# Patient Record
Sex: FEMALE | Race: WHITE | NOT HISPANIC OR LATINO | Employment: FULL TIME | URBAN - METROPOLITAN AREA
[De-identification: names, ages, dates, MRNs, and addresses within clinical notes are randomized per-mention and may not be internally consistent; named-entity substitution may affect disease eponyms.]

---

## 2019-01-14 ENCOUNTER — OFFICE VISIT (OUTPATIENT)
Dept: FAMILY MEDICINE CLINIC | Facility: CLINIC | Age: 59
End: 2019-01-14
Payer: COMMERCIAL

## 2019-01-14 VITALS
HEIGHT: 65 IN | BODY MASS INDEX: 17.86 KG/M2 | SYSTOLIC BLOOD PRESSURE: 104 MMHG | TEMPERATURE: 99.3 F | DIASTOLIC BLOOD PRESSURE: 78 MMHG | WEIGHT: 107.2 LBS

## 2019-01-14 DIAGNOSIS — Z12.11 SCREENING FOR COLON CANCER: ICD-10-CM

## 2019-01-14 DIAGNOSIS — Z23 ENCOUNTER FOR VACCINATION: ICD-10-CM

## 2019-01-14 DIAGNOSIS — R10.11 RIGHT UPPER QUADRANT ABDOMINAL PAIN: ICD-10-CM

## 2019-01-14 DIAGNOSIS — K59.01 SLOW TRANSIT CONSTIPATION: Primary | ICD-10-CM

## 2019-01-14 DIAGNOSIS — Z11.59 NEED FOR HEPATITIS C SCREENING TEST: ICD-10-CM

## 2019-01-14 DIAGNOSIS — Z12.4 SCREENING FOR CERVICAL CANCER: ICD-10-CM

## 2019-01-14 PROCEDURE — 99213 OFFICE O/P EST LOW 20 MIN: CPT | Performed by: FAMILY MEDICINE

## 2019-01-14 RX ORDER — OXYCODONE AND ACETAMINOPHEN 7.5; 325 MG/1; MG/1
TABLET ORAL EVERY 6 HOURS
COMMUNITY
End: 2019-11-05

## 2019-01-14 RX ORDER — PREGABALIN 75 MG/1
1 CAPSULE ORAL 2 TIMES DAILY
COMMUNITY
Start: 2013-02-20 | End: 2019-11-05

## 2019-01-14 RX ORDER — DULOXETIN HYDROCHLORIDE 30 MG/1
30 CAPSULE, DELAYED RELEASE ORAL DAILY
COMMUNITY
End: 2019-11-05

## 2019-01-14 RX ORDER — ALPRAZOLAM 0.5 MG/1
TABLET ORAL
COMMUNITY
End: 2019-11-05

## 2019-01-14 RX ORDER — OMEPRAZOLE 20 MG/1
1 CAPSULE, DELAYED RELEASE ORAL DAILY
COMMUNITY
Start: 2015-09-08 | End: 2019-11-05

## 2019-01-14 RX ORDER — ZOLPIDEM TARTRATE 10 MG/1
TABLET ORAL
COMMUNITY
End: 2019-11-05

## 2019-01-14 RX ORDER — LIDOCAINE 50 MG/G
OINTMENT TOPICAL AS NEEDED
COMMUNITY
Start: 2015-04-28 | End: 2019-11-05

## 2019-01-14 RX ORDER — CARISOPRODOL 350 MG/1
350 TABLET ORAL
COMMUNITY
Start: 2013-02-20 | End: 2019-11-05

## 2019-01-14 RX ORDER — LUBIPROSTONE 8 UG/1
CAPSULE, GELATIN COATED ORAL EVERY 12 HOURS
COMMUNITY
End: 2019-01-14

## 2019-01-14 RX ORDER — MELOXICAM 7.5 MG/1
7.5 TABLET ORAL DAILY
COMMUNITY
End: 2019-11-05

## 2019-01-14 RX ORDER — TRAMADOL HYDROCHLORIDE 50 MG/1
TABLET ORAL EVERY 6 HOURS
COMMUNITY
End: 2019-11-05

## 2019-01-14 NOTE — PROGRESS NOTES
Assessment/Plan:  Patient will try to minimize use of tramadol or Percocet  Patient go for ultrasound of the right upper quadrant to rule out gallbladder disease  Patient will see GI in follow-up  Would recommend stool softener routinely  Patient will stop laxatives  Patient use MiraLax daily  Diagnoses and all orders for this visit:    Right upper quadrant abdominal pain  -     US abdomen limited; Future  -     Ambulatory referral to Gastroenterology; Future    Screening for colon cancer  -     Ambulatory referral to Gastroenterology; Future    Need for hepatitis C screening test    Encounter for vaccination  -     Flu vaccine greater than or equal to 4yo preservative free IM  -     TDAP VACCINE GREATER THAN OR EQUAL TO 6YO IM    Screening for cervical cancer  -     Ambulatory referral to Gynecology; Future    Slow transit constipation  -     Ambulatory referral to Gastroenterology; Future    Other orders  -     Cancel: Occult Blood, Fecal Immunochemical; Future  -     Cancel: Hepatitis C antibody; Future  -     ALPRAZolam (XANAX) 0 5 mg tablet; Every 6 hours  -     Discontinue: lubiprostone (AMITIZA) 8 mcg capsule; Every 12 hours  -     DULoxetine (CYMBALTA) 30 mg delayed release capsule; Take 30 mg by mouth daily    -     lidocaine (XYLOCAINE) 5 % ointment; Apply topically  -     pregabalin (LYRICA) 75 mg capsule; Take 1 capsule by mouth 2 (two) times a day  -     meloxicam (MOBIC) 7 5 mg tablet; Take 7 5 mg by mouth Daily    -     oxyCODONE-acetaminophen (PERCOCET) 7 5-325 MG per tablet; every 6 (six) hours  -     Discontinue: piroxicam (FELDENE) 20 mg capsule; Take 1 capsule by mouth Daily  -     omeprazole (PRILOSEC) 20 mg delayed release capsule; Take 1 capsule by mouth daily  -     carisoprodol (SOMA) 350 mg tablet; Take 350 mg by mouth daily at bedtime    -     traMADol (ULTRAM) 50 mg tablet; every 6 (six) hours  -     zolpidem (AMBIEN) 10 mg tablet;  Take 1 tablet as needed by oral route in the evening for 30 days  Subjective:      Patient ID: Meghana Kimbrough is a 60 y o  female  Patient is here with constipation and some abdominal bloating and distention over the course of 2 months  No rectal bleeding noted  Patient also gets some discomfort in the left lower quadrant which she has presently  Patient also gets some pain after eating in the epigastric region  Patient has had some radiation to right upper chest wall and scapula  Patient has been eating slightly more meat than normal   Patient usually has vegetarian like diet  No fever or vomiting noted  Patient has had nausea  Patient has tried Amitiza as well as duplex for constipation  Patient has had 3 bouts of nausea  No substernal chest pain or shortness of breath  Patient has use Mag citrate  The patient has not had colonoscopy  Abdominal Pain   Associated symptoms include arthralgias and constipation  Pertinent negatives include no diarrhea, nausea or vomiting  The following portions of the patient's history were reviewed and updated as appropriate: allergies, current medications, past family history, past medical history, past social history, past surgical history and problem list     Review of Systems   Constitutional: Negative  HENT: Negative  Eyes: Negative  Respiratory: Negative  Cardiovascular: Negative  Gastrointestinal: Positive for abdominal distention, abdominal pain and constipation  Negative for blood in stool, diarrhea, nausea, rectal pain and vomiting  Endocrine: Negative  Genitourinary: Negative  Musculoskeletal: Positive for arthralgias, back pain and neck pain  Skin: Negative  Allergic/Immunologic: Negative  Neurological: Negative  Hematological: Negative  Psychiatric/Behavioral: Negative            Objective:      /78 (BP Location: Right arm, Patient Position: Sitting, Cuff Size: Adult)   Temp 99 3 °F (37 4 °C) (Tympanic)   Ht 5' 4 5" (1 638 m)   Wt 48 6 kg (107 lb 3 2 oz)   BMI 18 12 kg/m²          Physical Exam   Constitutional: She is oriented to person, place, and time  She appears well-developed and well-nourished  No distress  HENT:   Head: Normocephalic  Right Ear: External ear normal    Left Ear: External ear normal    Mouth/Throat: Oropharynx is clear and moist  No oropharyngeal exudate  Eyes: Pupils are equal, round, and reactive to light  EOM are normal  Right eye exhibits no discharge  Left eye exhibits no discharge  No scleral icterus  Neck: Normal range of motion  Neck supple  No thyromegaly present  Cardiovascular: Normal rate, regular rhythm, normal heart sounds and intact distal pulses  Exam reveals no gallop and no friction rub  No murmur heard  Pulmonary/Chest: Effort normal and breath sounds normal  No respiratory distress  She has no wheezes  She has no rales  She exhibits no tenderness  Abdominal: Soft  Bowel sounds are normal  She exhibits no distension  There is tenderness  There is no rebound and no guarding  Mild discomfort left lower quadrant   Musculoskeletal: Normal range of motion  She exhibits no edema or tenderness  Lymphadenopathy:     She has no cervical adenopathy  Neurological: She is oriented to person, place, and time  No cranial nerve deficit  She exhibits normal muscle tone  Coordination normal    Skin: Skin is warm and dry  No rash noted  She is not diaphoretic  No erythema  No pallor  Psychiatric: She has a normal mood and affect  Her behavior is normal  Judgment and thought content normal    Nursing note and vitals reviewed

## 2019-02-28 ENCOUNTER — OFFICE VISIT (OUTPATIENT)
Dept: FAMILY MEDICINE CLINIC | Facility: CLINIC | Age: 59
End: 2019-02-28
Payer: COMMERCIAL

## 2019-02-28 VITALS
BODY MASS INDEX: 18.23 KG/M2 | SYSTOLIC BLOOD PRESSURE: 128 MMHG | DIASTOLIC BLOOD PRESSURE: 76 MMHG | WEIGHT: 106.8 LBS | HEIGHT: 64 IN | TEMPERATURE: 99 F

## 2019-02-28 DIAGNOSIS — R41.3 MEMORY LOSS: ICD-10-CM

## 2019-02-28 DIAGNOSIS — H53.9 VISUAL DISTURBANCE: Primary | ICD-10-CM

## 2019-02-28 DIAGNOSIS — G44.219 EPISODIC TENSION-TYPE HEADACHE, NOT INTRACTABLE: ICD-10-CM

## 2019-02-28 DIAGNOSIS — R22.0 SCALP MASS: ICD-10-CM

## 2019-02-28 DIAGNOSIS — F41.9 ANXIETY: ICD-10-CM

## 2019-02-28 DIAGNOSIS — R42 DIZZINESS: ICD-10-CM

## 2019-02-28 PROBLEM — G44.209 TENSION TYPE HEADACHE: Status: ACTIVE | Noted: 2019-02-28

## 2019-02-28 PROCEDURE — 99214 OFFICE O/P EST MOD 30 MIN: CPT | Performed by: FAMILY MEDICINE

## 2019-02-28 PROCEDURE — 1036F TOBACCO NON-USER: CPT | Performed by: FAMILY MEDICINE

## 2019-02-28 PROCEDURE — 3008F BODY MASS INDEX DOCD: CPT | Performed by: FAMILY MEDICINE

## 2019-02-28 NOTE — PROGRESS NOTES
Assessment/Plan:  Patient go for MRI as well as laboratory studies  Guidance given overall  The patient will then have treatment dictated by the studies  Diagnoses and all orders for this visit:    Visual disturbance  -     CBC and differential; Future  -     Comprehensive metabolic panel; Future  -     Hemoglobin A1C; Future  -     Lipid panel; Future  -     TSH, 3rd generation with Free T4 reflex; Future  -     Vitamin B12; Future  -     Vitamin D 25 hydroxy; Future  -     Magnesium; Future  -     MRI brain w wo contrast; Future  -     Lyme Antibody Profile with reflex to WB; Future  -     UA w Reflex to Microscopic w Reflex to Culture -Lab Collect    Episodic tension-type headache, not intractable  -     CBC and differential; Future  -     Comprehensive metabolic panel; Future  -     Hemoglobin A1C; Future  -     Lipid panel; Future  -     TSH, 3rd generation with Free T4 reflex; Future  -     Vitamin B12; Future  -     Vitamin D 25 hydroxy; Future  -     Magnesium; Future  -     MRI brain w wo contrast; Future    Dizziness  -     CBC and differential; Future  -     Comprehensive metabolic panel; Future  -     Hemoglobin A1C; Future  -     Lipid panel; Future  -     TSH, 3rd generation with Free T4 reflex; Future  -     Vitamin B12; Future  -     Vitamin D 25 hydroxy; Future  -     Magnesium; Future  -     MRI brain w wo contrast; Future  -     Lyme Antibody Profile with reflex to WB; Future  -     UA w Reflex to Microscopic w Reflex to Culture -Lab Collect    Memory loss  -     CBC and differential; Future  -     Comprehensive metabolic panel; Future  -     Hemoglobin A1C; Future  -     Lipid panel; Future  -     TSH, 3rd generation with Free T4 reflex; Future  -     Vitamin B12; Future  -     Vitamin D 25 hydroxy; Future  -     Magnesium; Future  -     MRI brain w wo contrast; Future  -     Lyme Antibody Profile with reflex to WB;  Future  -     UA w Reflex to Microscopic w Reflex to Culture -Lab Collect    Anxiety  -     CBC and differential; Future  -     Comprehensive metabolic panel; Future  -     Hemoglobin A1C; Future  -     Lipid panel; Future  -     TSH, 3rd generation with Free T4 reflex; Future  -     Vitamin B12; Future  -     Vitamin D 25 hydroxy; Future  -     Magnesium; Future    Scalp mass  -     MRI brain w wo contrast; Future          Subjective:      Patient ID: Ana Rosa is a 60 y o  female  Patient is here with mass on top of her head for roughly 2 weeks  Patient notices a burning sensation over this region  Patient also with some tingling in her hands and upper lip  Patient also with some visual disturbance/blurred vision  Patient also with intermittent headaches in the parietal occipital region not relieved by Tylenol  Patient also with some dizziness and feeling off balance along with some memory related issues  Patient also with increased stress related to family  The following portions of the patient's history were reviewed and updated as appropriate: allergies, current medications, past family history, past medical history, past social history, past surgical history and problem list     Review of Systems   Constitutional: Negative  HENT: Negative  Eyes: Positive for visual disturbance  Respiratory: Negative  Cardiovascular: Negative  Gastrointestinal: Negative  Endocrine: Negative  Genitourinary: Negative  Musculoskeletal: Negative  Skin: Negative  The scalp mass   Allergic/Immunologic: Negative  Neurological: Positive for dizziness, numbness and headaches  Memory loss   Hematological: Negative  Psychiatric/Behavioral: Positive for behavioral problems           Objective:      /76 (BP Location: Right arm, Patient Position: Sitting, Cuff Size: Adult)   Temp 99 °F (37 2 °C) (Tympanic)   Ht 5' 4 25" (1 632 m)   Wt 48 4 kg (106 lb 12 8 oz)   BMI 18 19 kg/m²          Physical Exam   Constitutional: She is oriented to person, place, and time  She appears well-developed and well-nourished  No distress  HENT:   Head: Normocephalic  Right Ear: External ear normal    Left Ear: External ear normal    Mouth/Throat: Oropharynx is clear and moist  No oropharyngeal exudate  Eyes: Pupils are equal, round, and reactive to light  EOM are normal  Right eye exhibits no discharge  Left eye exhibits no discharge  No scleral icterus  Neck: Normal range of motion  Neck supple  No thyromegaly present  Cardiovascular: Normal rate, regular rhythm, normal heart sounds and intact distal pulses  Exam reveals no gallop and no friction rub  No murmur heard  Pulmonary/Chest: Effort normal and breath sounds normal  No respiratory distress  She has no wheezes  She has no rales  She exhibits no tenderness  Abdominal: Soft  Bowel sounds are normal  She exhibits no distension  There is no tenderness  There is no rebound and no guarding  Musculoskeletal: Normal range of motion  She exhibits no edema or tenderness  Lymphadenopathy:     She has no cervical adenopathy  Neurological: She is alert and oriented to person, place, and time  A sensory deficit is present  No cranial nerve deficit  She exhibits normal muscle tone  Coordination normal    Skin: Skin is warm and dry  No rash noted  She is not diaphoretic  No erythema  No pallor  The Pilar cyst/mass on scalp right of midline parietal region   Psychiatric: She has a normal mood and affect  Her behavior is normal  Judgment and thought content normal    Nursing note and vitals reviewed

## 2019-03-06 ENCOUNTER — TELEPHONE (OUTPATIENT)
Dept: FAMILY MEDICINE CLINIC | Facility: CLINIC | Age: 59
End: 2019-03-06

## 2019-03-06 LAB
25(OH)D3 SERPL-MCNC: 15 NG/ML (ref 30–100)
ALBUMIN SERPL-MCNC: 4.5 G/DL (ref 3.6–5.1)
ALBUMIN/GLOB SERPL: 1.5 (CALC) (ref 1–2.5)
ALP SERPL-CCNC: 90 U/L (ref 33–130)
ALT SERPL-CCNC: 36 U/L (ref 6–29)
APPEARANCE UR: CLEAR
AST SERPL-CCNC: 32 U/L (ref 10–35)
B BURGDOR AB SER IA-ACNC: <0.9 INDEX
BACTERIA UR CULT: NORMAL
BACTERIA UR QL AUTO: NORMAL /HPF
BASOPHILS # BLD AUTO: 24 CELLS/UL (ref 0–200)
BASOPHILS NFR BLD AUTO: 0.3 %
BILIRUB SERPL-MCNC: 0.6 MG/DL (ref 0.2–1.2)
BILIRUB UR QL STRIP: NEGATIVE
BUN SERPL-MCNC: 15 MG/DL (ref 7–25)
BUN/CREAT SERPL: ABNORMAL (CALC) (ref 6–22)
CALCIUM SERPL-MCNC: 9.5 MG/DL (ref 8.6–10.4)
CHLORIDE SERPL-SCNC: 103 MMOL/L (ref 98–110)
CHOLEST SERPL-MCNC: 196 MG/DL
CHOLEST/HDLC SERPL: 2.4 (CALC)
CO2 SERPL-SCNC: 30 MMOL/L (ref 20–32)
COLOR UR: YELLOW
CREAT SERPL-MCNC: 0.84 MG/DL (ref 0.5–1.05)
EOSINOPHIL # BLD AUTO: 87 CELLS/UL (ref 15–500)
EOSINOPHIL NFR BLD AUTO: 1.1 %
ERYTHROCYTE [DISTWIDTH] IN BLOOD BY AUTOMATED COUNT: 11.8 % (ref 11–15)
GLOBULIN SER CALC-MCNC: 3.1 G/DL (CALC) (ref 1.9–3.7)
GLUCOSE SERPL-MCNC: 91 MG/DL (ref 65–99)
GLUCOSE UR QL STRIP: NEGATIVE
HBA1C MFR BLD: 5.2 % OF TOTAL HGB
HCT VFR BLD AUTO: 44.1 % (ref 35–45)
HDLC SERPL-MCNC: 83 MG/DL
HGB BLD-MCNC: 15 G/DL (ref 11.7–15.5)
HGB UR QL STRIP: NEGATIVE
HYALINE CASTS #/AREA URNS LPF: NORMAL /LPF
KETONES UR QL STRIP: NEGATIVE
LDLC SERPL CALC-MCNC: 100 MG/DL (CALC)
LEUKOCYTE ESTERASE UR QL STRIP: NEGATIVE
LYMPHOCYTES # BLD AUTO: 2252 CELLS/UL (ref 850–3900)
LYMPHOCYTES NFR BLD AUTO: 28.5 %
MAGNESIUM SERPL-MCNC: 2.1 MG/DL (ref 1.5–2.5)
MCH RBC QN AUTO: 32.7 PG (ref 27–33)
MCHC RBC AUTO-ENTMCNC: 34 G/DL (ref 32–36)
MCV RBC AUTO: 96.1 FL (ref 80–100)
MONOCYTES # BLD AUTO: 545 CELLS/UL (ref 200–950)
MONOCYTES NFR BLD AUTO: 6.9 %
NEUTROPHILS # BLD AUTO: 4993 CELLS/UL (ref 1500–7800)
NEUTROPHILS NFR BLD AUTO: 63.2 %
NITRITE UR QL STRIP: NEGATIVE
NONHDLC SERPL-MCNC: 113 MG/DL (CALC)
PH UR STRIP: NORMAL [PH] (ref 5–8)
PLATELET # BLD AUTO: 273 THOUSAND/UL (ref 140–400)
PMV BLD REES-ECKER: 11.7 FL (ref 7.5–12.5)
POTASSIUM SERPL-SCNC: 4.1 MMOL/L (ref 3.5–5.3)
PROT SERPL-MCNC: 7.6 G/DL (ref 6.1–8.1)
PROT UR QL STRIP: NEGATIVE
RBC # BLD AUTO: 4.59 MILLION/UL (ref 3.8–5.1)
RBC #/AREA URNS HPF: NORMAL /HPF
SL AMB EGFR AFRICAN AMERICAN: 88 ML/MIN/1.73M2
SL AMB EGFR NON AFRICAN AMERICAN: 76 ML/MIN/1.73M2
SODIUM SERPL-SCNC: 140 MMOL/L (ref 135–146)
SP GR UR STRIP: 1.02 (ref 1–1.03)
SQUAMOUS #/AREA URNS HPF: NORMAL /HPF
TRIGL SERPL-MCNC: 52 MG/DL
TSH SERPL-ACNC: 1.39 MIU/L (ref 0.4–4.5)
WBC # BLD AUTO: 7.9 THOUSAND/UL (ref 3.8–10.8)
WBC #/AREA URNS HPF: NORMAL /HPF

## 2019-03-06 NOTE — TELEPHONE ENCOUNTER
Dr Gayla Brown order vit D3  [address]315 unit  for 13 weeks, ordered today then stated to patient to re-check with in six months

## 2019-03-14 ENCOUNTER — HOSPITAL ENCOUNTER (OUTPATIENT)
Dept: MRI IMAGING | Facility: HOSPITAL | Age: 59
Discharge: HOME | End: 2019-03-14
Payer: COMMERCIAL

## 2019-03-14 DIAGNOSIS — H53.9 VISUAL DISTURBANCE: ICD-10-CM

## 2019-03-14 DIAGNOSIS — R42 DIZZINESS: ICD-10-CM

## 2019-03-14 DIAGNOSIS — R22.0 SCALP MASS: ICD-10-CM

## 2019-03-14 DIAGNOSIS — R41.3 MEMORY LOSS: ICD-10-CM

## 2019-03-14 DIAGNOSIS — G44.219 EPISODIC TENSION-TYPE HEADACHE, NOT INTRACTABLE: ICD-10-CM

## 2019-03-14 PROCEDURE — 70553 MRI BRAIN STEM W/O & W/DYE: CPT

## 2019-03-14 PROCEDURE — A9585 GADOBUTROL INJECTION: HCPCS | Performed by: FAMILY MEDICINE

## 2019-03-14 RX ADMIN — GADOBUTROL 5 ML: 604.72 INJECTION INTRAVENOUS at 14:32

## 2019-03-28 DIAGNOSIS — D33.3 SCHWANNOMA OF CRANIAL NERVE (HCC): Primary | ICD-10-CM

## 2019-05-15 ENCOUNTER — TELEPHONE (OUTPATIENT)
Dept: FAMILY MEDICINE CLINIC | Facility: CLINIC | Age: 59
End: 2019-05-15

## 2019-05-16 ENCOUNTER — OFFICE VISIT (OUTPATIENT)
Dept: FAMILY MEDICINE CLINIC | Facility: CLINIC | Age: 59
End: 2019-05-16
Payer: COMMERCIAL

## 2019-05-16 VITALS
HEIGHT: 64 IN | DIASTOLIC BLOOD PRESSURE: 72 MMHG | BODY MASS INDEX: 18.3 KG/M2 | WEIGHT: 107.2 LBS | SYSTOLIC BLOOD PRESSURE: 100 MMHG

## 2019-05-16 DIAGNOSIS — R22.0 SCALP MASS: ICD-10-CM

## 2019-05-16 DIAGNOSIS — Z11.59 NEED FOR HEPATITIS C SCREENING TEST: Primary | ICD-10-CM

## 2019-05-16 DIAGNOSIS — D33.3 ACOUSTIC NEUROMA (HCC): ICD-10-CM

## 2019-05-16 PROCEDURE — 1036F TOBACCO NON-USER: CPT | Performed by: FAMILY MEDICINE

## 2019-05-16 PROCEDURE — 3008F BODY MASS INDEX DOCD: CPT | Performed by: FAMILY MEDICINE

## 2019-05-16 PROCEDURE — 99213 OFFICE O/P EST LOW 20 MIN: CPT | Performed by: FAMILY MEDICINE

## 2019-10-16 ENCOUNTER — OFFICE VISIT (OUTPATIENT)
Dept: FAMILY MEDICINE CLINIC | Facility: CLINIC | Age: 59
End: 2019-10-16
Payer: COMMERCIAL

## 2019-10-16 VITALS
WEIGHT: 106 LBS | DIASTOLIC BLOOD PRESSURE: 62 MMHG | SYSTOLIC BLOOD PRESSURE: 110 MMHG | HEART RATE: 86 BPM | BODY MASS INDEX: 18.1 KG/M2 | HEIGHT: 64 IN

## 2019-10-16 DIAGNOSIS — D33.3 ACOUSTIC NEUROMA (HCC): ICD-10-CM

## 2019-10-16 DIAGNOSIS — K59.01 SLOW TRANSIT CONSTIPATION: ICD-10-CM

## 2019-10-16 DIAGNOSIS — D49.9 TUMOR: Primary | ICD-10-CM

## 2019-10-16 DIAGNOSIS — Z01.818 PREOP EXAMINATION: Primary | ICD-10-CM

## 2019-10-16 DIAGNOSIS — M19.079 ARTHRITIS OF BIG TOE: ICD-10-CM

## 2019-10-16 PROCEDURE — 99214 OFFICE O/P EST MOD 30 MIN: CPT | Performed by: FAMILY MEDICINE

## 2019-10-16 NOTE — PROGRESS NOTES
Assessment/Plan:  Patient will stop meloxicam 1 week prior to surgery  Guidance given overall  Patient at low risk for cardiopulmonary event  Okay to proceed with surgery if CBC is normal   Labs pending  Diagnoses and all orders for this visit:    Preop examination    Arthritis of big toe    Acoustic neuroma (HCC)    Slow transit constipation            Subjective:        Patient ID: Jose Hernández is a 60 y o  female  Patient is here for preop clearance requested by Dr Emir Escalera for right 1st toe cheilectomy to be done on November 12th  Patient without chest pain or shortness of breath or headache or blurred vision or problems urinating or defecating  No dizziness or passing out or palpitations  No fevers or chills or cough or cold symptoms  Patient does have pain in her right 1st toe with decreased range of motion  No bleeding issues such as rectal bleeding urinary bleeding or nosebleeds  Patient does not have problems with anesthesia in the past   Patient did have laboratory studies done yesterday  Results are pending        The following portions of the patient's history were reviewed and updated as appropriate: allergies, current medications, past family history, past medical history, past social history, past surgical history and problem list       Review of Systems   Constitutional: Negative  HENT: Negative  Eyes: Negative  Respiratory: Negative  Cardiovascular: Negative  Gastrointestinal: Negative  Endocrine: Negative  Genitourinary: Negative  Musculoskeletal: Positive for arthralgias and gait problem  Skin: Negative  Allergic/Immunologic: Negative  Hematological: Negative  Psychiatric/Behavioral: Negative  Objective:      /62 (BP Location: Right arm)   Pulse 86   Ht 5' 4 25" (1 632 m)   Wt 48 1 kg (106 lb)   BMI 18 05 kg/m²          Physical Exam   Constitutional: She is oriented to person, place, and time   She appears well-developed and well-nourished  No distress  HENT:   Head: Normocephalic  Right Ear: External ear normal    Left Ear: External ear normal    Mouth/Throat: Oropharynx is clear and moist  No oropharyngeal exudate  Eyes: Pupils are equal, round, and reactive to light  EOM are normal  Right eye exhibits no discharge  Left eye exhibits no discharge  No scleral icterus  Neck: Normal range of motion  Neck supple  No thyromegaly present  Cardiovascular: Normal rate, regular rhythm, normal heart sounds and intact distal pulses  Exam reveals no gallop and no friction rub  No murmur heard  Pulmonary/Chest: Effort normal and breath sounds normal  No respiratory distress  She has no wheezes  She has no rales  She exhibits no tenderness  Abdominal: Soft  Bowel sounds are normal  She exhibits no distension  There is no tenderness  There is no rebound and no guarding  Musculoskeletal: She exhibits tenderness and deformity  She exhibits no edema  Swelling of 1st MTP joint on the right  No significant pain but decreased range of motion  Lymphadenopathy:     She has no cervical adenopathy  Neurological: She is oriented to person, place, and time  No cranial nerve deficit  She exhibits normal muscle tone  Coordination normal    Skin: Skin is warm and dry  No rash noted  She is not diaphoretic  No erythema  No pallor  Psychiatric: She has a normal mood and affect  Her behavior is normal  Judgment and thought content normal    Nursing note and vitals reviewed

## 2019-10-18 DIAGNOSIS — R22.0 SCALP MASS: Primary | ICD-10-CM

## 2019-10-18 NOTE — PROGRESS NOTES
Patient called in today for lab testing prior to her seeing [facility]  LABS:    FSH, LH, Growth Hormone, TSH-T4 Free, T3, ACTH, Cortisol, Prolacin, IGF-1    All were approved by patients' insurance

## 2019-10-22 LAB
CORTIS AM PEAK SERPL-MCNC: 16.5 MCG/DL
FSH SERPL-ACNC: 99 MIU/ML
GH SERPL-MCNC: 7.3 NG/ML
IGF-I SERPL-MCNC: 109 NG/ML (ref 41–279)
IGF-I Z-SCORE SERPL: -0.4 SD
LH SERPL-ACNC: 23.9 MIU/ML
PROLACTIN SERPL-MCNC: 5.7 NG/ML
T3 SERPL-MCNC: 112 NG/DL (ref 76–181)
T4 FREE SERPL-MCNC: 1.2 NG/DL (ref 0.8–1.8)
TSH SERPL-ACNC: 0.87 MIU/L (ref 0.4–4.5)

## 2019-11-04 ENCOUNTER — OFFICE VISIT (OUTPATIENT)
Dept: NEUROSURGERY | Facility: CLINIC | Age: 59
End: 2019-11-04
Payer: COMMERCIAL

## 2019-11-04 VITALS
RESPIRATION RATE: 16 BRPM | HEART RATE: 91 BPM | WEIGHT: 105 LBS | BODY MASS INDEX: 17.49 KG/M2 | SYSTOLIC BLOOD PRESSURE: 119 MMHG | DIASTOLIC BLOOD PRESSURE: 81 MMHG | TEMPERATURE: 98.5 F | HEIGHT: 65 IN

## 2019-11-04 DIAGNOSIS — D33.3 ACOUSTIC NEUROMA (HCC): Primary | ICD-10-CM

## 2019-11-04 DIAGNOSIS — R90.89 ABNORMAL BRAIN MRI: ICD-10-CM

## 2019-11-04 PROCEDURE — 99204 OFFICE O/P NEW MOD 45 MIN: CPT | Performed by: NEUROLOGICAL SURGERY

## 2019-11-04 RX ORDER — ACETAMINOPHEN 325 MG/1
650 TABLET ORAL 2 TIMES DAILY
COMMUNITY
End: 2021-10-26

## 2019-11-04 RX ORDER — MULTIVITAMIN
1 TABLET ORAL DAILY
COMMUNITY

## 2019-11-04 NOTE — PROGRESS NOTES
Neurosurgery Office Note  Eliseo Kowalski 60 y o  female MRN: [id number]      Assessment/Plan      Diagnoses and all orders for this visit:    Acoustic neuroma [facility])  -     MRI brain IAC wo and w contrast; Future  -     Audiometry with tympanometry; Future    Abnormal brain MRI    Other orders  -     acetaminophen (TYLENOL) 325 mg tablet; Take 650 mg by mouth as needed for mild pain  -     Multiple Vitamin (MULTIVITAMIN) tablet; Take 1 tablet by mouth daily  -     VITAMIN D PO; Take by mouth daily        Discussion:     60year old woman referred by Dr Jennifer Crain to address pituitary finding on MRI scan, as well as presumed right acoustic neuroma  Patient feels she has had worsening headaches, that she feels are most consistent with migraines  She is an AP  In psychology/psychiatry  She denies hearing loss, vertigo, tinnitus  She did mention her father had an acoustic neuroma  She has other more endocrinologic complaints such as hair thinning/loss, brittle mass, hot cold sensitivity, etc   She was seen at [facility] and referred for an MRI scan of the brain  This study made note of findings in the pituitary and right IAC  She was not satisfied with her follow-up at  [place] [address], and has been referred by her PCP for management  MRI scan of brain from 2019 personally reviewed  There is mention of hypointensity in the posterior pituitary  On the previous study, but post Radiology and I agree on the present study is occiput quite normal   She does have a 2 mm enhancing area in the right IAC, which was also visible on the MRI scan with contrast of the brain done 10/2008  It appears the same size  I reviewed the patient natural history of acoustic neuromas  I discussed options for treatment, but certainly in her case recommend observation  As she has a scan done some 11 years ago, I feel a follow-up study in 1-2 years would be reasonable    The patient also 1 on the on a tree at [facility] cristina, was told it was normal   We will attempt to obtain these records  I think follow-up audiometry at the same time as her upcoming MRI scan would also be reasonable  Regarding a pituitary finding, her pituitary panel labs were also reviewed personally  While her growth hormone is just at the upper edge of normal, or IGF 1 and or other labs are within normal limits  As such I do not feel she has clearly a pituitary finding  This we can follow with surveillance imaging of her IAC  Patient had issues also with intermittent shortness of breath, chest pain, finger tingling, etc   She is under considerable stress lately with, her mother is now living with her etc   She remarked she will discuss having a cardiac evaluation with Dr Ron Arias  Furthermore, should this not be successful managing her headaches, referral to  Headache Neurology may be reasonable  Otherwise all plans patient back in a year or 2 with follow-up audiometry an MRI scan, which we have ordered  metrics:  [id number], VA S 70, KPS 90, ECOG 0     CHIEF COMPLAINT    Chief Complaint   Patient presents with    Consult     NP Consult for 2nd Opinion; referred by Dr Kesha Harley    History of Present Illness     60y o  year old female     HPI    See Discussion    REVIEW OF SYSTEMS    Review of Systems   Constitutional: Positive for fatigue (severe fatigue, new) and fever (1 episode in October with dizziness and nausea)  HENT: Negative  C/o hair loss   Eyes: Positive for photophobia, pain (pain behind the eyes with HA) and visual disturbance (blurred vision and floaters, wiggling)  1 episode of visual disturbance where vision crossed and then came back in September   Respiratory: Positive for chest tightness and shortness of breath  Cardiovascular: Positive for chest pain and palpitations  Gastrointestinal: Positive for nausea  Endocrine: Positive for cold intolerance  Genitourinary: Negative  Musculoskeletal: Positive for arthralgias (sometimes every joint in her body hurts), back pain, joint swelling, neck pain and neck stiffness  Skin: Negative  Allergic/Immunologic: Positive for environmental allergies and food allergies  Neurological: Positive for dizziness, light-headedness, numbness (fingertips) and headaches  Hematological: Negative  Psychiatric/Behavioral: Positive for agitation, confusion, decreased concentration and sleep disturbance  Negative for dysphoric mood  The patient is nervous/anxious  Meds/Allergies     Current Outpatient Medications   Medication Sig Dispense Refill    acetaminophen (TYLENOL) 325 mg tablet Take 650 mg by mouth as needed for mild pain      Multiple Vitamin (MULTIVITAMIN) tablet Take 1 tablet by mouth daily      VITAMIN D PO Take by mouth daily      ALPRAZolam (XANAX) 0 5 mg tablet Every 6 hours      carisoprodol (SOMA) 350 mg tablet Take 350 mg by mouth daily at bedtime        DULoxetine (CYMBALTA) 30 mg delayed release capsule Take 30 mg by mouth daily        lidocaine (XYLOCAINE) 5 % ointment Apply topically as needed       meloxicam (MOBIC) 7 5 mg tablet Take 7 5 mg by mouth Daily        omeprazole (PRILOSEC) 20 mg delayed release capsule Take 1 capsule by mouth daily      oxyCODONE-acetaminophen (PERCOCET) 7 5-325 MG per tablet every 6 (six) hours      pregabalin (LYRICA) 75 mg capsule Take 1 capsule by mouth 2 (two) times a day      traMADol (ULTRAM) 50 mg tablet every 6 (six) hours      zolpidem (AMBIEN) 10 mg tablet Take 1 tablet as needed by oral route in the evening for 30 days  No current facility-administered medications for this visit  Allergies   Allergen Reactions    Other Anaphylaxis     Hazelnuts    Psyllium Itching    Sulfa Antibiotics        PAST HISTORY    History reviewed  No pertinent past medical history  History reviewed  No pertinent surgical history      Social History     Tobacco Use    Smoking status: Former Smoker     Last attempt to quit: 2014     Years since quittin 8    Smokeless tobacco: Never Used    Tobacco comment: on and off over 40 years , less than a pck per day   Substance Use Topics    Alcohol use: No    Drug use: No       Family History   Problem Relation Age of Onset    Diabetes Family     Leukemia Family          Above history personally reviewed  EXAM    Vitals:Blood pressure 119/81, pulse 91, temperature 98 5 °F (36 9 °C), temperature source Tympanic, resp  rate 16, height 5' 5" (1 651 m), weight 47 6 kg (105 lb)  ,Body mass index is 17 47 kg/m²  Physical Exam   Constitutional: She is oriented to person, place, and time  She appears well-developed and well-nourished  HENT:   Head: Normocephalic  Eyes: No scleral icterus  Neck: Neck supple  Cardiovascular: Normal rate  Pulmonary/Chest: Effort normal    Abdominal: Soft  Neurological: She is alert and oriented to person, place, and time  GCS eye subscore is 4  GCS verbal subscore is 5  GCS motor subscore is 6  Skin: Skin is warm and dry  Psychiatric: She has a normal mood and affect  Her speech is normal and behavior is normal    Vitals reviewed  Neurologic Exam     Mental Status   Oriented to person, place, and time  Attention: normal    Speech: speech is normal   Level of consciousness: alert    Cranial Nerves     CN VII   Facial expression full, symmetric  Motor Exam   Muscle bulk: normal  Overall muscle tone: normal  Moves all extremities, grossly normal     Gait, Coordination, and Reflexes     Tremor   Resting tremor: absent  Intention tremor: absent  Action tremor: absent  No aids  MEDICAL DECISION MAKING    Imaging Studies:     MRI brain  [facility] 2019: I have personally reviewed pertinent reports     and I have personally reviewed pertinent films in PACS     Lab Results   Component Value Date    PROLACTIN 5 7 10/19/2019    CALCITONIN 109 10/19/2019    FREET4 1 2 10/19/2019    FSH 99 0 10/19/2019    LH 23 9 10/19/2019   GH 7 3 (H)  TSH 0 87

## 2019-11-04 NOTE — H&P (VIEW-ONLY)
Neurosurgery Office Note  Annabel Hill 60 y o  female MRN: [id number]      Assessment/Plan      Diagnoses and all orders for this visit:    Acoustic neuroma [facility])  -     MRI brain IAC wo and w contrast; Future  -     Audiometry with tympanometry; Future    Abnormal brain MRI    Other orders  -     acetaminophen (TYLENOL) 325 mg tablet; Take 650 mg by mouth as needed for mild pain  -     Multiple Vitamin (MULTIVITAMIN) tablet; Take 1 tablet by mouth daily  -     VITAMIN D PO; Take by mouth daily        Discussion:     60year old woman referred by Dr Meghan Bowser to address pituitary finding on MRI scan, as well as presumed right acoustic neuroma  Patient feels she has had worsening headaches, that she feels are most consistent with migraines  She is an AP  In psychology/psychiatry  She denies hearing loss, vertigo, tinnitus  She did mention her father had an acoustic neuroma  She has other more endocrinologic complaints such as hair thinning/loss, brittle mass, hot cold sensitivity, etc   She was seen at [facility] and referred for an MRI scan of the brain  This study made note of findings in the pituitary and right IAC  She was not satisfied with her follow-up at [facility], and has been referred by her PCP for management  MRI scan of brain from 2019 personally reviewed  There is mention of hypointensity in the posterior pituitary  On the previous study, but post Radiology and I agree on the present study is occiput quite normal   She does have a 2 mm enhancing area in the right IAC, which was also visible on the MRI scan with contrast of the brain done 10/2008  It appears the same size  I reviewed the patient natural history of acoustic neuromas  I discussed options for treatment, but certainly in her case recommend observation  As she has a scan done some 11 years ago, I feel a follow-up study in 1-2 years would be reasonable    The patient also 1 on the on a tree at [facility], was told it was normal   We will attempt to obtain these records  I think follow-up audiometry at the same time as her upcoming MRI scan would also be reasonable  Regarding a pituitary finding, her pituitary panel labs were also reviewed personally  While her growth hormone is just at the upper edge of normal, or IGF 1 and or other labs are within normal limits  As such I do not feel she has clearly a pituitary finding  This we can follow with surveillance imaging of her IAC  Patient had issues also with intermittent shortness of breath, chest pain, finger tingling, etc   She is under considerable stress lately with, her mother is now living with her etc   She remarked she will discuss having a cardiac evaluation with Dr Adelina Barragan  Furthermore, should this not be successful managing her headaches, referral to  Headache Neurology may be reasonable  Otherwise all plans patient back in a year or 2 with follow-up audiometry an MRI scan, which we have ordered  metrics:  [id number], VA S 70, KPS 90, ECOG 0     CHIEF COMPLAINT    Chief Complaint   Patient presents with    Consult     NP Consult for 2nd Opinion; referred by Dr Cruz Lantigua    History of Present Illness     60y o  year old female     HPI    See Discussion    REVIEW OF SYSTEMS    Review of Systems   Constitutional: Positive for fatigue (severe fatigue, new) and fever (1 episode in October with dizziness and nausea)  HENT: Negative  C/o hair loss   Eyes: Positive for photophobia, pain (pain behind the eyes with HA) and visual disturbance (blurred vision and floaters, wiggling)  1 episode of visual disturbance where vision crossed and then came back in September   Respiratory: Positive for chest tightness and shortness of breath  Cardiovascular: Positive for chest pain and palpitations  Gastrointestinal: Positive for nausea  Endocrine: Positive for cold intolerance  Genitourinary: Negative  Musculoskeletal: Positive for arthralgias (sometimes every joint in her body hurts), back pain, joint swelling, neck pain and neck stiffness  Skin: Negative  Allergic/Immunologic: Positive for environmental allergies and food allergies  Neurological: Positive for dizziness, light-headedness, numbness (fingertips) and headaches  Hematological: Negative  Psychiatric/Behavioral: Positive for agitation, confusion, decreased concentration and sleep disturbance  Negative for dysphoric mood  The patient is nervous/anxious  Meds/Allergies     Current Outpatient Medications   Medication Sig Dispense Refill    acetaminophen (TYLENOL) 325 mg tablet Take 650 mg by mouth as needed for mild pain      Multiple Vitamin (MULTIVITAMIN) tablet Take 1 tablet by mouth daily      VITAMIN D PO Take by mouth daily      ALPRAZolam (XANAX) 0 5 mg tablet Every 6 hours      carisoprodol (SOMA) 350 mg tablet Take 350 mg by mouth daily at bedtime        DULoxetine (CYMBALTA) 30 mg delayed release capsule Take 30 mg by mouth daily        lidocaine (XYLOCAINE) 5 % ointment Apply topically as needed       meloxicam (MOBIC) 7 5 mg tablet Take 7 5 mg by mouth Daily        omeprazole (PRILOSEC) 20 mg delayed release capsule Take 1 capsule by mouth daily      oxyCODONE-acetaminophen (PERCOCET) 7 5-325 MG per tablet every 6 (six) hours      pregabalin (LYRICA) 75 mg capsule Take 1 capsule by mouth 2 (two) times a day      traMADol (ULTRAM) 50 mg tablet every 6 (six) hours      zolpidem (AMBIEN) 10 mg tablet Take 1 tablet as needed by oral route in the evening for 30 days  No current facility-administered medications for this visit  Allergies   Allergen Reactions    Other Anaphylaxis     Hazelnuts    Psyllium Itching    Sulfa Antibiotics        PAST HISTORY    History reviewed  No pertinent past medical history  History reviewed  No pertinent surgical history      Social History     Tobacco Use    Smoking status: Former Smoker     Last attempt to quit: 2014     Years since quittin 8    Smokeless tobacco: Never Used    Tobacco comment: on and off over 40 years , less than a pck per day   Substance Use Topics    Alcohol use: No    Drug use: No       Family History   Problem Relation Age of Onset    Diabetes Family     Leukemia Family          Above history personally reviewed  EXAM    Vitals:Blood pressure 119/81, pulse 91, temperature 98 5 °F (36 9 °C), temperature source Tympanic, resp  rate 16, height 5' 5" (1 651 m), weight 47 6 kg (105 lb)  ,Body mass index is 17 47 kg/m²  Physical Exam   Constitutional: She is oriented to person, place, and time  She appears well-developed and well-nourished  HENT:   Head: Normocephalic  Eyes: No scleral icterus  Neck: Neck supple  Cardiovascular: Normal rate  Pulmonary/Chest: Effort normal    Abdominal: Soft  Neurological: She is alert and oriented to person, place, and time  GCS eye subscore is 4  GCS verbal subscore is 5  GCS motor subscore is 6  Skin: Skin is warm and dry  Psychiatric: She has a normal mood and affect  Her speech is normal and behavior is normal    Vitals reviewed  Neurologic Exam     Mental Status   Oriented to person, place, and time  Attention: normal    Speech: speech is normal   Level of consciousness: alert    Cranial Nerves     CN VII   Facial expression full, symmetric  Motor Exam   Muscle bulk: normal  Overall muscle tone: normal  Moves all extremities, grossly normal     Gait, Coordination, and Reflexes     Tremor   Resting tremor: absent  Intention tremor: absent  Action tremor: absent  No aids  MEDICAL DECISION MAKING    Imaging Studies:     MRI brain  [facility] 2019: I have personally reviewed pertinent reports     and I have personally reviewed pertinent films in PACS     Lab Results   Component Value Date    PROLACTIN 5 7 10/19/2019    CALCITONIN 109 10/19/2019    FREET4 1 2 10/19/2019    FSH 99 0 10/19/2019    LH 23 9 10/19/2019   GH 7 3 (H)  TSH 0 87

## 2019-11-05 NOTE — PERIOPERATIVE NURSING NOTE
Patient reports has recently had some new shortness of breath with exertion  Has reported it to her PCP at her visit for H&P    Reports testing so far has been normal

## 2019-11-05 NOTE — PRE-PROCEDURE INSTRUCTIONS
Pre-Surgery Instructions:   Medication Instructions    acetaminophen (TYLENOL) 325 mg tablet Instructed patient per Anesthesia Guidelines   Multiple Vitamin (MULTIVITAMIN) tablet Instructed patient per Anesthesia Guidelines   VITAMIN D PO Instructed patient per Anesthesia Guidelines  Instructed has no medications to be taken the morning of surgery  No aspirin, NSAIDs, vitamins, or supplements 1 week before surgery

## 2019-11-08 ENCOUNTER — DOCUMENTATION (OUTPATIENT)
Dept: NEUROSURGERY | Facility: CLINIC | Age: 59
End: 2019-11-08

## 2019-11-11 ENCOUNTER — ANESTHESIA EVENT (OUTPATIENT)
Dept: PERIOP | Facility: HOSPITAL | Age: 59
End: 2019-11-11
Payer: COMMERCIAL

## 2019-11-12 ENCOUNTER — ANESTHESIA (OUTPATIENT)
Dept: PERIOP | Facility: HOSPITAL | Age: 59
End: 2019-11-12
Payer: COMMERCIAL

## 2019-11-12 ENCOUNTER — APPOINTMENT (OUTPATIENT)
Dept: RADIOLOGY | Facility: HOSPITAL | Age: 59
End: 2019-11-12
Payer: COMMERCIAL

## 2019-11-12 ENCOUNTER — HOSPITAL ENCOUNTER (OUTPATIENT)
Facility: HOSPITAL | Age: 59
Setting detail: OUTPATIENT SURGERY
Discharge: HOME | End: 2019-11-12
Attending: PODIATRIST | Admitting: PODIATRIST
Payer: COMMERCIAL

## 2019-11-12 VITALS
HEART RATE: 78 BPM | OXYGEN SATURATION: 98 % | RESPIRATION RATE: 16 BRPM | DIASTOLIC BLOOD PRESSURE: 57 MMHG | SYSTOLIC BLOOD PRESSURE: 101 MMHG | HEIGHT: 65 IN | BODY MASS INDEX: 17.49 KG/M2 | TEMPERATURE: 97.5 F | WEIGHT: 105 LBS

## 2019-11-12 PROCEDURE — 73630 X-RAY EXAM OF FOOT: CPT

## 2019-11-12 RX ORDER — MIDAZOLAM HYDROCHLORIDE 2 MG/2ML
INJECTION, SOLUTION INTRAMUSCULAR; INTRAVENOUS AS NEEDED
Status: DISCONTINUED | OUTPATIENT
Start: 2019-11-12 | End: 2019-11-12 | Stop reason: SURG

## 2019-11-12 RX ORDER — DEXAMETHASONE SODIUM PHOSPHATE 4 MG/ML
INJECTION, SOLUTION INTRA-ARTICULAR; INTRALESIONAL; INTRAMUSCULAR; INTRAVENOUS; SOFT TISSUE AS NEEDED
Status: DISCONTINUED | OUTPATIENT
Start: 2019-11-12 | End: 2019-11-12 | Stop reason: HOSPADM

## 2019-11-12 RX ORDER — LIDOCAINE HYDROCHLORIDE 10 MG/ML
INJECTION, SOLUTION EPIDURAL; INFILTRATION; INTRACAUDAL; PERINEURAL AS NEEDED
Status: DISCONTINUED | OUTPATIENT
Start: 2019-11-12 | End: 2019-11-12 | Stop reason: HOSPADM

## 2019-11-12 RX ORDER — FENTANYL CITRATE/PF 50 MCG/ML
25 SYRINGE (ML) INJECTION
Status: DISCONTINUED | OUTPATIENT
Start: 2019-11-12 | End: 2019-11-12 | Stop reason: HOSPADM

## 2019-11-12 RX ORDER — SODIUM CHLORIDE 9 MG/ML
125 INJECTION, SOLUTION INTRAVENOUS CONTINUOUS
Status: DISCONTINUED | OUTPATIENT
Start: 2019-11-12 | End: 2019-11-12 | Stop reason: HOSPADM

## 2019-11-12 RX ORDER — FENTANYL CITRATE 50 UG/ML
INJECTION, SOLUTION INTRAMUSCULAR; INTRAVENOUS AS NEEDED
Status: DISCONTINUED | OUTPATIENT
Start: 2019-11-12 | End: 2019-11-12 | Stop reason: SURG

## 2019-11-12 RX ORDER — PROPOFOL 10 MG/ML
INJECTION, EMULSION INTRAVENOUS CONTINUOUS PRN
Status: DISCONTINUED | OUTPATIENT
Start: 2019-11-12 | End: 2019-11-12 | Stop reason: SURG

## 2019-11-12 RX ORDER — ONDANSETRON 2 MG/ML
INJECTION INTRAMUSCULAR; INTRAVENOUS AS NEEDED
Status: DISCONTINUED | OUTPATIENT
Start: 2019-11-12 | End: 2019-11-12 | Stop reason: SURG

## 2019-11-12 RX ORDER — ONDANSETRON 2 MG/ML
4 INJECTION INTRAMUSCULAR; INTRAVENOUS ONCE AS NEEDED
Status: DISCONTINUED | OUTPATIENT
Start: 2019-11-12 | End: 2019-11-12 | Stop reason: HOSPADM

## 2019-11-12 RX ORDER — MAGNESIUM HYDROXIDE 1200 MG/15ML
LIQUID ORAL AS NEEDED
Status: DISCONTINUED | OUTPATIENT
Start: 2019-11-12 | End: 2019-11-12 | Stop reason: HOSPADM

## 2019-11-12 RX ORDER — OXYCODONE HYDROCHLORIDE AND ACETAMINOPHEN 5; 325 MG/1; MG/1
1 TABLET ORAL EVERY 4 HOURS PRN
Status: DISCONTINUED | OUTPATIENT
Start: 2019-11-12 | End: 2019-11-12 | Stop reason: HOSPADM

## 2019-11-12 RX ADMIN — PROPOFOL 150 MCG/KG/MIN: 10 INJECTION, EMULSION INTRAVENOUS at 13:20

## 2019-11-12 RX ADMIN — SODIUM CHLORIDE 125 ML/HR: 0.9 INJECTION, SOLUTION INTRAVENOUS at 11:59

## 2019-11-12 RX ADMIN — FENTANYL CITRATE 100 MCG: 50 INJECTION, SOLUTION INTRAMUSCULAR; INTRAVENOUS at 13:23

## 2019-11-12 RX ADMIN — MIDAZOLAM 2 MG: 1 INJECTION INTRAMUSCULAR; INTRAVENOUS at 13:16

## 2019-11-12 RX ADMIN — ONDANSETRON 4 MG: 2 INJECTION INTRAMUSCULAR; INTRAVENOUS at 14:06

## 2019-11-12 RX ADMIN — FENTANYL CITRATE 100 MCG: 50 INJECTION, SOLUTION INTRAMUSCULAR; INTRAVENOUS at 13:34

## 2019-11-12 RX ADMIN — FENTANYL CITRATE 100 MCG: 50 INJECTION, SOLUTION INTRAMUSCULAR; INTRAVENOUS at 13:16

## 2019-11-12 RX ADMIN — FENTANYL CITRATE 25 MCG: 50 INJECTION, SOLUTION INTRAMUSCULAR; INTRAVENOUS at 13:54

## 2019-11-12 NOTE — OP NOTE
OPERATIVE REPORT  PATIENT NAME: Martha Aparicio    :  1960  MRN: [id number]  Pt Location: [facility] OR ROOM 04    SURGERY DATE: 2019    Surgeon(s) and Role:     * Hiram Acosta DPM - Primary     * Mandy Faith DPM - Assisting    Preop Diagnosis:  Hallux rigidus, right foot [M20 21]    Post-Op Diagnosis Codes:     * Hallux rigidus, right foot [M20 21]    Procedure(s) (LRB):  CHEILECTOMY (Right)    Specimen(s):  * No specimens in log *    Estimated Blood Loss:   Minimal    Drains:  * No LDAs found *    Anesthesia Type:   Choice    Operative Indications:  Hallux rigidus, right foot [M20 21]    Operative Findings:  Consistent with diagnosis; severe denuded cartilage of 1st metatarsal head and base of proximal phalanx, joint mice noted    Complications:   None    Procedure and Technique:  Under mild sedation the patient was brought into the operating room and transferred on table in supine position  IV sedation was achieved by anesthesia team and a universal time-out was performed where all parties in agreement correct patient, correct procedure and correct site  10 cc of 1 1 mixture of 1% lidocaine plain and had presented with complaints objective into the right foot in a Vinson block fashion  The foot was not prepped and draped in usual aseptic manner  An Esmarch bandage was used to exsanguinate the foot and the pneumatic tourniquet was inflated to 250 mm Hg  Attention was directed to the 1st metatarsophalangeal joint and using a 15 blade a dorsal linear incision was made  Dissection was carried down using sharp instrumentation, care taken to retract all vital neural structures as well as extensor tendon  The capsules and incised, denuded cartilage was noted in the 1st metatarsal head as well as the base of proximal phalanx, joint mice was noted, severe degenerative changes noted  The surgical site was then irrigated with copious amounts of normal sterile saline    The capsule was repaired using 3-0 Vicryl, subcutaneous tissues repaired using 4-0 Vicryl and the skin was repaired using 4-0 Monocryl in a running subcuticular fashion  10 cc of 1% lidocaine plain was injected postoperatively as well as 4 mg of dexamethasone  The pneumatic tourniquet was deflated approximately 45 minutes and proper hyperemic response was noted to all digits  The incision site was then dressed with Steri-Strips, 4 x 4, Amadeo, 4 and Ace wrap  The patient tolerated procedure well without any complications and transferred to PACU vital signs stable  Dr Darian Pena was present for the entire procedure and participated in all key aspects of the procedure      Patient Disposition:  PACU  and hemodynamically stable    SIGNATURE: Soumya Andrea DPM  DATE: November 12, 2019  TIME: 2:26 PM

## 2019-11-12 NOTE — ANESTHESIA PREPROCEDURE EVALUATION
Review of Systems/Medical History  Patient summary reviewed  Chart reviewed  No history of anesthetic complications     Cardiovascular  Negative cardio ROS    Pulmonary  Negative pulmonary ROS        GI/Hepatic  Negative GI/hepatic ROS          Negative  ROS        Endo/Other  Negative endo/other ROS      GYN  Negative gynecology ROS          Hematology  Negative hematology ROS      Musculoskeletal  Negative musculoskeletal ROS   Arthritis     Neurology    Headaches,    Psychology   Negative psychology ROS Anxiety,              Physical Exam    Airway    Mallampati score: II  TM Distance: >3 FB  Neck ROM: full     Dental       Cardiovascular  Comment: Negative ROS, Rhythm: regular, Rate: normal, Cardiovascular exam normal    Pulmonary  Pulmonary exam normal Breath sounds clear to auscultation,     Other Findings        Anesthesia Plan  ASA Score- 2     Anesthesia Type- IV sedation with anesthesia with ASA Monitors  Additional Monitors:   Airway Plan:     Comment: GA prn  Plan Factors-Patient not instructed to abstain from smoking on day of procedure  Patient did not smoke on day of surgery  Induction- intravenous  Postoperative Plan-     Informed Consent- Anesthetic plan and risks discussed with patient

## 2019-11-12 NOTE — ANESTHESIA POSTPROCEDURE EVALUATION
Post-Op Assessment Note    CV Status:  Stable  Pain Score: 1    Pain management: adequate     Mental Status:  Alert and awake   Hydration Status:  Euvolemic   PONV Controlled:  Controlled   Airway Patency:  Patent   Post Op Vitals Reviewed: Yes      Staff: Anesthesiologist           /54 (11/12/19 1427)    Temp     Pulse (!) 114 (11/12/19 1427)   Resp 15 (11/12/19 1427)    SpO2 96 % (11/12/19 1427)

## 2019-11-12 NOTE — PROGRESS NOTES
Patient seen complaining eye pain  No gross scratch note  Instructed to call if pain did not resolve

## 2019-11-12 NOTE — INTERVAL H&P NOTE
H&P reviewed  After examining the patient I find no changes in the patients condition since the H&P had been written      Vitals:    11/12/19 1147   BP: 114/52   Pulse: 78   Resp: 16   Temp: (!) 97 4 °F (36 3 °C)   SpO2: 98%

## 2019-11-12 NOTE — DISCHARGE INSTRUCTIONS
Dr Haley Mix Instructions    1  Take your prescribed medication as directed  2  Upon arrival at home, lie down and elevate your surgical foot on 2 pillows  3  Remain quiet, off your feet as much as possible, for the first 24-48 hours  This is when your feet first swell and may become painful  After 48 hours you may begin limited walking following these restrictions:   Weightbear as tolerated to surgical foot  4  Drink large quantities of water and citrus fruit juice  Consume no alcohol  Continue a well-balanced diet  5  Report any unusual discomfort or fever to this office  6  A limited amount of discomfort and swelling is to be expected  In some cases the skin may take on a bruised appearance  The surgical solution that was applied to your foot prior to the operation is dark in color and the operation site may appear to be oozing when it actually is not  7  A slight amount of blood is to be expected, and is no cause for alarm  Do not remove the dressings  If there is active bleeding and if the bleeding persists, add additional gauze to the bandage, apply direct pressure, elevate your feet and call this office  8  Do not get the dressings wet  As regular bathing may be inconvenient, sponge baths are recommended  9  When anesthesia wears off and if any discomfort should be present, apply an ice pack directly over the operated area for 15 minute intervals for several hours or until the pain leaves  (USE IN EXCESS OF 15 MINUTES COULD CAUSE FROSTBITE)  Do not use hot water bags or electric pads  A convenient icepack can be made by placing ice cubes in a plastic bag and covering this with a towel  10  If necessary, take a mild laxative before retiring  11  Wear your special open shoes anytime you put weight on your foot, even if it is just to walk to the bathroom and back  It will probably be 2 or 3 weeks before you will be permitted to try regular shoes    12  Having performed the operation, we are interested in a prompt recovery  Please cooperate by following the above instructions  13  Please call to confirm your post-op appointment or call with any other questions

## 2019-11-12 NOTE — DISCHARGE SUMMARY
Discharge Summary Outpatient Procedure Podiatry -   Hernando Bhatt 60 y o  female MRN: [id number]  Unit/Bed#: OR [place] Encounter: [id number]    Admission Date: 11/12/2019     Admitting Diagnosis: Hallux rigidus, right foot [M20 21]    Discharge Diagnosis: same    Procedures Performed: CHEILECTOMY:     Complications: none    Condition at Discharge: stable    Discharge instructions/Information to patient and family:   See after visit summary for information provided to patient and family  Provisions for Follow-Up Care/Important appointments:  See after visit summary for information related to follow-up care and any pertinent home health orders  Discharge Medications:  See after visit summary for reconciled discharge medications provided to patient and family

## 2020-01-23 ENCOUNTER — TRANSCRIBE ORDERS (OUTPATIENT)
Dept: PHYSICAL THERAPY | Facility: CLINIC | Age: 60
End: 2020-01-23

## 2020-01-23 ENCOUNTER — EVALUATION (OUTPATIENT)
Dept: PHYSICAL THERAPY | Facility: CLINIC | Age: 60
End: 2020-01-23
Payer: COMMERCIAL

## 2020-01-23 DIAGNOSIS — M79.674 GREAT TOE PAIN, RIGHT: Primary | ICD-10-CM

## 2020-01-23 PROCEDURE — 97140 MANUAL THERAPY 1/> REGIONS: CPT | Performed by: PHYSICAL THERAPIST

## 2020-01-23 PROCEDURE — 97110 THERAPEUTIC EXERCISES: CPT | Performed by: PHYSICAL THERAPIST

## 2020-01-23 PROCEDURE — 97161 PT EVAL LOW COMPLEX 20 MIN: CPT | Performed by: PHYSICAL THERAPIST

## 2020-01-23 NOTE — PROGRESS NOTES
PT Evaluation     Today's date: 2020  Patient name: Billy Aguilar  : 1960  MRN: [id number]  Referring provider: Bard Nash DPM  Dx:   Encounter Diagnosis     ICD-10-CM    1  Great toe pain, right M79 [id number]                   Assessment  Assessment details: Billy Aguilar is a pleasant 60 y o  female presents s/p R CHEILECTOMY  1st MTP joint stiffness noted with excessive interphalangeal extension ROM compensation  Firm end feel noted and will benefit from further joint mobilization and stretching with restrengthening as motion returns  Pt tends to WB through lateral foot during gait to avoid MTP flexion and increased pain  Address gait pattern as needed  No further referral appears necessary at this time  Primary movement diagnosis of decreased mobility resulting in symptoms of 1st ray pain and limiting her participation/performance in walking longer distances, performing stairs and sleeping at night  Primary impairments include:  1) decreased 1st MTP flexion/extension PROM--> addressed with joint mobilization, STM and stretching  2) decreased 1st MTP flexion/exttension strength--> addressed with strengthening  3) poor tolerance to foot flat phase of gait--> addressed with NMRE    Skilled PT services appropriate to facilitate return to prior level of function with transition to home exercise program for independent management when appropriate  Impairments: abnormal muscle firing, abnormal muscle tone, abnormal or restricted ROM, impaired physical strength, lacks appropriate home exercise program and pain with function  Understanding of Dx/Px/POC: good   Prognosis: good    Goals  Patient will successfully transition to home exercise program   Patient will be able to manage symptoms independently  LT  Pt will be able to hike for 20 minutes with no more than 2/10 foot pain within 7 weeks  2  Pt will meet foto predicted score within 7 weeks  ST   Pt will increase MTP flexion ROM by 8 degrees within 4 weeks  2  Pt will increase MTP extension ROM by 8 degrees within 4 weeks  Plan  Patient would benefit from: skilled PT  Referral necessary: No  Planned modality interventions: thermotherapy: hydrocollator packs  Planned therapy interventions: home exercise program, manual therapy, neuromuscular re-education, patient education, functional ROM exercises, strengthening, stretching, joint mobilization, graded activity, graded exercise, therapeutic exercise, body mechanics training, motor coordination training and activity modification  Frequency: 2x week  Duration in weeks: 12  Treatment plan discussed with: patient        Subjective Evaluation    History of Present Illness  Date of surgery: 2019  Mechanism of injury: Pt presents s/p R CHEILECTOMY with high levels of stiffness in the MTP joint  She reports that she just had cortisone injection a few days ago due to high levels of pain from the great toe not moving  Pt reports walking on the outside of her foot recently to compensate for lack of motion  Pt reports scar highly sensitive as well  Pt denies numbness, tingling and discoloration of foot or excessive hot or cold  Pt reports some unsteadiness due to walking on side of foot  Pt reports spasms in toe at night with pressure of covers and mild swelling  Prior to surgery she had years of tightness in the great toe and pain underneath the foot  She would like to just get some motion back    Pain  Current pain ratin  At best pain ratin  At worst pain ratin  Quality: burning    Treatments  Current treatment: physical therapy        Objective     Active Range of Motion   Left Ankle/Foot   Normal active range of motion    Right Ankle/Foot   Dorsiflexion (ke): 10 degrees   Dorsiflexion (kf): 10 degrees   Plantar flexion: WFL  Great toe flexion: 5 degrees   Great toe extension: 5 degrees     Passive Range of Motion     Additional Passive Range of Motion Details  PROM similar to AROM    Strength/Myotome Testing     Right Ankle/Foot   Dorsiflexion: 5  Plantar flexion: 4+  Inversion: 5  Eversion: 5  Great toe flexion: 4  Great toe extension: 4    General Comments: Ankle/Foot Comments   Firm end feel with MTP flexion and extension with good tolerance to mobilization and relief noted  Scar healing nicely and no openings/discharge noted               Precautions: R CHEILECTOMY      Manual  1/23            R prone PA MTP joint mobs CB            R MTP flex/ext stretching CB            R DF mobs nv            VG DL squats w/half foam and MTP mobs nv            Scar tissue massage nv                Exercise Diary  1/23            Seated toe raises 1x5            Standing toe raises 1x5            functional heel raises nv            Seated MTP flexion 1x5            Exaggerated march nv            Exaggerated gait nv            Standing gastroc stretch 3x10:            Functional soleus stretch 3x10:                                                                                                                                                                            Modalities

## 2020-01-23 NOTE — LETTER
2020    [address] [facility] [address]    Patient: Eduarda Quick   YOB: 1960   Date of Visit: 2020     Encounter Diagnosis     ICD-10-CM    1  Great toe pain, right [id number]        Dear Dr Kaylene Bejarano: Thank you for your recent referral of Eduarda Quick  Please review the attached evaluation summary from Judy's recent visit  Please verify that you agree with the plan of care by signing the attached order  If you have any questions or concerns, please do not hesitate to call  I sincerely appreciate the opportunity to share in the care of one of your patients and hope to have another opportunity to work with you in the near future  Sincerely,    Dakota Doe, PT      Referring Provider:      I certify that I have read the below Plan of Care and certify the need for these services furnished under this plan of treatment while under my care  Leopoldo [address]: [phone]          PT Evaluation     Today's date: 2020  Patient name: Eduarda Quick  : 1960  MRN: [id number]  Referring provider: Bronwyn Purcell DPM  Dx:   Encounter Diagnosis     ICD-10-CM    1  Great toe pain, right [id number]                   Assessment  Assessment details: Eduarda Quick is a pleasant 60 y o  female presents s/p R CHEILECTOMY  1st MTP joint stiffness noted with excessive interphalangeal extension ROM compensation  Firm end feel noted and will benefit from further joint mobilization and stretching with restrengthening as motion returns  Pt tends to WB through lateral foot during gait to avoid MTP flexion and increased pain  Address gait pattern as needed  No further referral appears necessary at this time       Primary movement diagnosis of decreased mobility resulting in symptoms of 1st ray pain and limiting her participation/performance in walking longer distances, performing stairs and sleeping at night  Primary impairments include:  1) decreased 1st MTP flexion/extension PROM--> addressed with joint mobilization, STM and stretching  2) decreased 1st MTP flexion/exttension strength--> addressed with strengthening  3) poor tolerance to foot flat phase of gait--> addressed with NMRE    Skilled PT services appropriate to facilitate return to prior level of function with transition to home exercise program for independent management when appropriate  Impairments: abnormal muscle firing, abnormal muscle tone, abnormal or restricted ROM, impaired physical strength, lacks appropriate home exercise program and pain with function  Understanding of Dx/Px/POC: good   Prognosis: good    Goals  Patient will successfully transition to home exercise program   Patient will be able to manage symptoms independently  LT  Pt will be able to hike for 20 minutes with no more than 2/10 foot pain within 7 weeks  2  Pt will meet foto predicted score within 7 weeks  ST  Pt will increase MTP flexion ROM by 8 degrees within 4 weeks  2  Pt will increase MTP extension ROM by 8 degrees within 4 weeks  Plan  Patient would benefit from: skilled PT  Referral necessary: No  Planned modality interventions: thermotherapy: hydrocollator packs  Planned therapy interventions: home exercise program, manual therapy, neuromuscular re-education, patient education, functional ROM exercises, strengthening, stretching, joint mobilization, graded activity, graded exercise, therapeutic exercise, body mechanics training, motor coordination training and activity modification  Frequency: 2x week  Duration in weeks: 12  Treatment plan discussed with: patient        Subjective Evaluation    History of Present Illness  Date of surgery: 2019  Mechanism of injury: Pt presents s/p R CHEILECTOMY with high levels of stiffness in the MTP joint   She reports that she just had cortisone injection a few days ago due to high levels of pain from the great toe not moving  Pt reports walking on the outside of her foot recently to compensate for lack of motion  Pt reports scar highly sensitive as well  Pt denies numbness, tingling and discoloration of foot or excessive hot or cold  Pt reports some unsteadiness due to walking on side of foot  Pt reports spasms in toe at night with pressure of covers and mild swelling  Prior to surgery she had years of tightness in the great toe and pain underneath the foot  She would like to just get some motion back  Pain  Current pain ratin  At best pain ratin  At worst pain ratin  Quality: burning    Treatments  Current treatment: physical therapy        Objective     Active Range of Motion   Left Ankle/Foot   Normal active range of motion    Right Ankle/Foot   Dorsiflexion (ke): 10 degrees   Dorsiflexion (kf): 10 degrees   Plantar flexion: WFL  Great toe flexion: 5 degrees   Great toe extension: 5 degrees     Passive Range of Motion     Additional Passive Range of Motion Details  PROM similar to AROM    Strength/Myotome Testing     Right Ankle/Foot   Dorsiflexion: 5  Plantar flexion: 4+  Inversion: 5  Eversion: 5  Great toe flexion: 4  Great toe extension: 4    General Comments: Ankle/Foot Comments   Firm end feel with MTP flexion and extension with good tolerance to mobilization and relief noted  Scar healing nicely and no openings/discharge noted               Precautions: R CHEILECTOMY      Manual              R prone PA MTP joint mobs CB            R MTP flex/ext stretching CB            R DF mobs nv            VG DL squats w/half foam and MTP mobs nv            Scar tissue massage nv                Exercise Diary              Seated toe raises 1x5            Standing toe raises 1x5            functional heel raises nv            Seated MTP flexion 1x5            Exaggerated march nv            Exaggerated gait nv            Standing gastroc stretch 3x10: Functional soleus stretch 3x10:                                                                                                                                                                            Modalities

## 2020-01-27 ENCOUNTER — OFFICE VISIT (OUTPATIENT)
Dept: PHYSICAL THERAPY | Facility: CLINIC | Age: 60
End: 2020-01-27
Payer: COMMERCIAL

## 2020-01-27 DIAGNOSIS — M79.674 GREAT TOE PAIN, RIGHT: Primary | ICD-10-CM

## 2020-01-27 PROCEDURE — 97110 THERAPEUTIC EXERCISES: CPT | Performed by: PHYSICAL THERAPIST

## 2020-01-27 PROCEDURE — 97140 MANUAL THERAPY 1/> REGIONS: CPT | Performed by: PHYSICAL THERAPIST

## 2020-01-27 PROCEDURE — 97112 NEUROMUSCULAR REEDUCATION: CPT | Performed by: PHYSICAL THERAPIST

## 2020-01-27 NOTE — PROGRESS NOTES
Daily Note     Today's date: 2020  Patient name: Shantelle Colunga  : 1960  MRN: [id number]  Referring provider: Heydi Castellon DPM  Dx:   Encounter Diagnosis     ICD-10-CM    1  Great toe pain, right M79 674                   Subjective: Pt reports that she has much decreased pain and she notices more motion in her toe  Objective: See treatment diary below      Assessment: Tolerated treatment well  Patient would benefit from continued PT  Pt with 5 degree increase in flexion and extension ROM at MTP joint and improvements in gait pattern (decreased lateral foot walking) and improvements in pain with weight acceptance  20:    Pt did not come to appointment and was called  She said she has been very busy and can't fit therapy in but she is feeling better/not walking on the side of her foot anymore  She has continued with HEP  Pt episode will be left open for 1 week as she is seeing referring physician again on 20  If no call in 1 week, pt agreed to plan for DC  Pt understands she may call back at any point  Plan: Continue per plan of care        Precautions: R CHEILECTOMY      Manual             R prone PA MTP joint mobs CB CB           R MTP flex/ext stretching CB CB           R DF mobs nv CB           Scar tissue massage nv CB               Exercise Diary             Seated toe raises 1x5 3X10           Standing toe raises 1x5 home           functional heel raises nv 2x10           Seated MTP flexion 1x5 3x10           Exaggerated march nv 6x10:           Heel to toe walking nv 25ft           Standing gastroc stretch 3x10: 6x10:           Functional soleus stretch 3x10: 6x10:           bike  5' L1                                                                                                                                                              Modalities

## 2020-01-30 ENCOUNTER — APPOINTMENT (OUTPATIENT)
Dept: PHYSICAL THERAPY | Facility: CLINIC | Age: 60
End: 2020-01-30
Payer: COMMERCIAL

## 2020-03-12 ENCOUNTER — TELEPHONE (OUTPATIENT)
Dept: NEUROSURGERY | Facility: CLINIC | Age: 60
End: 2020-03-12

## 2020-03-12 NOTE — TELEPHONE ENCOUNTER
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

## 2020-03-20 ENCOUNTER — OFFICE VISIT (OUTPATIENT)
Dept: FAMILY MEDICINE CLINIC | Facility: CLINIC | Age: 60
End: 2020-03-20
Payer: COMMERCIAL

## 2020-03-20 ENCOUNTER — TELEPHONE (OUTPATIENT)
Dept: FAMILY MEDICINE CLINIC | Facility: CLINIC | Age: 60
End: 2020-03-20

## 2020-03-20 VITALS
DIASTOLIC BLOOD PRESSURE: 70 MMHG | WEIGHT: 108.6 LBS | BODY MASS INDEX: 18.09 KG/M2 | TEMPERATURE: 97.6 F | HEIGHT: 65 IN | SYSTOLIC BLOOD PRESSURE: 118 MMHG

## 2020-03-20 DIAGNOSIS — M31.6 TEMPORAL ARTERITIS (HCC): Primary | ICD-10-CM

## 2020-03-20 DIAGNOSIS — R22.0 SCALP MASS: ICD-10-CM

## 2020-03-20 DIAGNOSIS — R42 DIZZINESS: ICD-10-CM

## 2020-03-20 DIAGNOSIS — D33.3 ACOUSTIC NEUROMA (HCC): ICD-10-CM

## 2020-03-20 PROCEDURE — 3008F BODY MASS INDEX DOCD: CPT | Performed by: FAMILY MEDICINE

## 2020-03-20 PROCEDURE — 3008F BODY MASS INDEX DOCD: CPT | Performed by: PSYCHIATRY & NEUROLOGY

## 2020-03-20 PROCEDURE — 99214 OFFICE O/P EST MOD 30 MIN: CPT | Performed by: FAMILY MEDICINE

## 2020-03-20 PROCEDURE — 1036F TOBACCO NON-USER: CPT | Performed by: FAMILY MEDICINE

## 2020-03-20 RX ORDER — IBUPROFEN 600 MG/1
600 TABLET ORAL 2 TIMES DAILY
COMMUNITY
End: 2020-04-14

## 2020-03-20 RX ORDER — ALPRAZOLAM 0.5 MG/1
0.5 TABLET ORAL
COMMUNITY
End: 2021-10-26

## 2020-03-20 RX ORDER — PREDNISONE 10 MG/1
50 TABLET ORAL DAILY
Qty: 30 TABLET | Refills: 0 | Status: SHIPPED | OUTPATIENT
Start: 2020-03-20 | End: 2020-03-23 | Stop reason: SDUPTHER

## 2020-03-20 NOTE — PROGRESS NOTES
Assessment/Plan:  Patient go for laboratory studies  Patient go for carotid ultrasounds  Patient go for ultrasound of the temporal arteries  Patient restarted on prednisone 50 mg daily  Guidance given  Patient will take with food  To consider seeing Rheumatology urine Neurology or vascular surgery for biopsy  Patient follow-up after studies are done       Diagnoses and all orders for this visit:    Temporal arteritis ([facility] [address] )  -     CBC and differential; Future  -     Comprehensive metabolic panel; Future  -     C-reactive protein; Future  -     Sedimentation rate, automated; Future  -     VAS temporal artery duplex; Future  -     VAS carotid complete study; Future  -     predniSONE 10 mg tablet; Take 5 tablets (50 mg total) by mouth daily    Acoustic neuroma ([facility])  -     CBC and differential; Future  -     Comprehensive metabolic panel; Future  -     C-reactive protein; Future  -     Sedimentation rate, automated; Future  -     VAS carotid complete study; Future  -     predniSONE 10 mg tablet; Take 5 tablets (50 mg total) by mouth daily    Dizziness  -     CBC and differential; Future  -     Comprehensive metabolic panel; Future  -     C-reactive protein; Future  -     Sedimentation rate, automated; Future  -     VAS carotid complete study; Future  -     predniSONE 10 mg tablet; Take 5 tablets (50 mg total) by mouth daily    Scalp mass  -     US head neck soft tissue; Future    Other orders  -     ibuprofen (MOTRIN) 600 mg tablet; Take 200 mg by mouth as needed  -     ALPRAZolam (XANAX) 0 5 mg tablet; Take 0 5 mg by mouth daily at bedtime as needed for anxiety            Subjective:        Patient ID: Cindy Rodriguez is a 60 y o  female  Patient is here with headache on the right side of her head  Patient has pain in temple region  Patient also with pain over the frontal region  Patient's pain radiates front of her ear and down to her right side of her neck  Patient has noticed some slight swelling  Patient does notice some blurred vision on the right eye  No fever or chills noted  No URI symptoms  The patient with acoustic neuroma on the right  The patient with small 2 mm now mass on the pituitary gland posteriorly  Patient did have [facility]  The no surgery at this time as per son cataract  The patient also with some dizziness  The patient does notice pulsation  Patient also with neck discomfort as well as some back pain  Patient did see Neurology  The following portions of the patient's history were reviewed and updated as appropriate: allergies, current medications, past family history, past medical history, past social history, past surgical history and problem list       Review of Systems   Constitutional: Negative  HENT: Negative  Eyes: Negative  Respiratory: Negative  Cardiovascular: Negative  Gastrointestinal: Negative  Endocrine: Negative  Genitourinary: Negative  Musculoskeletal: Positive for back pain and neck pain  Skin: Negative  Allergic/Immunologic: Negative  Neurological: Positive for dizziness and headaches  Hematological: Negative  Psychiatric/Behavioral: Positive for sleep disturbance  Objective:      BMI Counseling: Body mass index is 18 07 kg/m²  The BMI is below normal  Patient advised to gain weight  /70 (BP Location: Right arm, Patient Position: Sitting, Cuff Size: Standard)   Temp 97 6 °F (36 4 °C) (Tympanic)   Ht 5' 5" (1 651 m)   Wt 49 3 kg (108 lb 9 6 oz)   BMI 18 07 kg/m²          Physical Exam   Constitutional: She appears well-developed and well-nourished  No distress  HENT:   Head: Normocephalic  Right Ear: External ear normal    Left Ear: External ear normal    Mouth/Throat: Oropharynx is clear and moist  No oropharyngeal exudate  Eyes: Pupils are equal, round, and reactive to light  EOM are normal  Right eye exhibits no discharge   Left eye exhibits no discharge  No scleral icterus  Neck: Normal range of motion  Neck supple  No thyromegaly present  Cardiovascular: Normal rate, regular rhythm, normal heart sounds and intact distal pulses  Exam reveals no gallop and no friction rub  No murmur heard  Pulmonary/Chest: Effort normal and breath sounds normal  No respiratory distress  She has no wheezes  She has no rales  She exhibits no tenderness  Abdominal: Soft  Bowel sounds are normal  She exhibits no distension  There is no tenderness  There is no rebound and no guarding  Musculoskeletal: Normal range of motion  She exhibits tenderness  She exhibits no edema  Lymphadenopathy:     She has no cervical adenopathy  Neurological: She is alert  No cranial nerve deficit  She exhibits normal muscle tone  Coordination normal    Skin: Skin is warm and dry  No rash noted  She is not diaphoretic  No erythema  No pallor  Mass midline scalp   Psychiatric: She has a normal mood and affect  Her behavior is normal  Judgment and thought content normal    Nursing note and vitals reviewed

## 2020-03-23 DIAGNOSIS — M31.6 TEMPORAL ARTERITIS (HCC): ICD-10-CM

## 2020-03-23 DIAGNOSIS — D33.3 ACOUSTIC NEUROMA (HCC): ICD-10-CM

## 2020-03-23 DIAGNOSIS — R42 DIZZINESS: ICD-10-CM

## 2020-03-23 LAB
ALBUMIN SERPL-MCNC: 4.5 G/DL (ref 3.6–5.1)
ALBUMIN/GLOB SERPL: 1.5 (CALC) (ref 1–2.5)
ALP SERPL-CCNC: 90 U/L (ref 37–153)
ALT SERPL-CCNC: 40 U/L (ref 6–29)
AST SERPL-CCNC: 43 U/L (ref 10–35)
BASOPHILS # BLD AUTO: 38 CELLS/UL (ref 0–200)
BASOPHILS NFR BLD AUTO: 0.5 %
BILIRUB SERPL-MCNC: 0.4 MG/DL (ref 0.2–1.2)
BUN SERPL-MCNC: 13 MG/DL (ref 7–25)
BUN/CREAT SERPL: ABNORMAL (CALC) (ref 6–22)
CALCIUM SERPL-MCNC: 9.5 MG/DL (ref 8.6–10.4)
CHLORIDE SERPL-SCNC: 104 MMOL/L (ref 98–110)
CO2 SERPL-SCNC: 30 MMOL/L (ref 20–32)
CREAT SERPL-MCNC: 0.95 MG/DL (ref 0.5–0.99)
CRP SERPL-MCNC: <0.2 MG/L
EOSINOPHIL # BLD AUTO: 129 CELLS/UL (ref 15–500)
EOSINOPHIL NFR BLD AUTO: 1.7 %
ERYTHROCYTE [DISTWIDTH] IN BLOOD BY AUTOMATED COUNT: 11.5 % (ref 11–15)
ERYTHROCYTE [SEDIMENTATION RATE] IN BLOOD BY WESTERGREN METHOD: 6 MM/H
GLOBULIN SER CALC-MCNC: 3.1 G/DL (CALC) (ref 1.9–3.7)
GLUCOSE SERPL-MCNC: 93 MG/DL (ref 65–139)
HCT VFR BLD AUTO: 44.3 % (ref 35–45)
HGB BLD-MCNC: 14.9 G/DL (ref 11.7–15.5)
LYMPHOCYTES # BLD AUTO: 3580 CELLS/UL (ref 850–3900)
LYMPHOCYTES NFR BLD AUTO: 47.1 %
MCH RBC QN AUTO: 31.8 PG (ref 27–33)
MCHC RBC AUTO-ENTMCNC: 33.6 G/DL (ref 32–36)
MCV RBC AUTO: 94.5 FL (ref 80–100)
MONOCYTES # BLD AUTO: 540 CELLS/UL (ref 200–950)
MONOCYTES NFR BLD AUTO: 7.1 %
NEUTROPHILS # BLD AUTO: 3314 CELLS/UL (ref 1500–7800)
NEUTROPHILS NFR BLD AUTO: 43.6 %
PLATELET # BLD AUTO: 323 THOUSAND/UL (ref 140–400)
PMV BLD REES-ECKER: 11.9 FL (ref 7.5–12.5)
POTASSIUM SERPL-SCNC: 4.2 MMOL/L (ref 3.5–5.3)
PROT SERPL-MCNC: 7.6 G/DL (ref 6.1–8.1)
RBC # BLD AUTO: 4.69 MILLION/UL (ref 3.8–5.1)
SL AMB EGFR AFRICAN AMERICAN: 75 ML/MIN/1.73M2
SL AMB EGFR NON AFRICAN AMERICAN: 65 ML/MIN/1.73M2
SODIUM SERPL-SCNC: 141 MMOL/L (ref 135–146)
WBC # BLD AUTO: 7.6 THOUSAND/UL (ref 3.8–10.8)

## 2020-03-23 RX ORDER — PREDNISONE 10 MG/1
TABLET ORAL
Qty: 45 TABLET | Refills: 0 | Status: SHIPPED | OUTPATIENT
Start: 2020-03-23 | End: 2020-04-14

## 2020-03-23 NOTE — TELEPHONE ENCOUNTER
Spoke with patient about staying on Prednisone 50 mg daily and follow up after all testing  Patient needs more Prednisone, as last testing is not even booked yet  Please advise regarding doses that should be ordered to go to [facility]  Thank you       ----- Message from Manoj Mejias DO sent at 3/23/2020  4:22 PM EDT -----  Call patient  Okay to continue with prednisone 50 mg daily as directed    Recommend follow-up appointment after all studies done to discuss ongoing treatment verses weaning

## 2020-03-25 ENCOUNTER — HOSPITAL ENCOUNTER (OUTPATIENT)
Dept: ULTRASOUND IMAGING | Facility: HOSPITAL | Age: 60
Discharge: HOME | End: 2020-03-25
Payer: COMMERCIAL

## 2020-03-25 ENCOUNTER — HOSPITAL ENCOUNTER (OUTPATIENT)
Dept: NON INVASIVE DIAGNOSTICS | Facility: HOSPITAL | Age: 60
Discharge: HOME | End: 2020-03-25
Payer: COMMERCIAL

## 2020-03-25 ENCOUNTER — TELEPHONE (OUTPATIENT)
Dept: FAMILY MEDICINE CLINIC | Facility: CLINIC | Age: 60
End: 2020-03-25

## 2020-03-25 DIAGNOSIS — R22.0 SCALP MASS: ICD-10-CM

## 2020-03-25 DIAGNOSIS — M31.6 TEMPORAL ARTERITIS (HCC): ICD-10-CM

## 2020-03-25 DIAGNOSIS — D33.3 ACOUSTIC NEUROMA (HCC): ICD-10-CM

## 2020-03-25 DIAGNOSIS — R42 DIZZINESS: ICD-10-CM

## 2020-03-25 DIAGNOSIS — R90.89 ABNORMAL BRAIN MRI: ICD-10-CM

## 2020-03-25 DIAGNOSIS — D33.3 ACOUSTIC NEUROMA (HCC): Primary | ICD-10-CM

## 2020-03-25 PROCEDURE — 76536 US EXAM OF HEAD AND NECK: CPT

## 2020-03-25 PROCEDURE — 93880 EXTRACRANIAL BILAT STUDY: CPT

## 2020-03-25 PROCEDURE — 93880 EXTRACRANIAL BILAT STUDY: CPT | Performed by: SURGERY

## 2020-03-25 NOTE — TELEPHONE ENCOUNTER
The call patient  Since ultrasound and MRI did not show any abnormality    Suggest referral to dermatology for evaluation

## 2020-03-25 NOTE — TELEPHONE ENCOUNTER
Wilbert Aguirre spoke to this patient and scheduled her for a virtual visit today to talk with Dr David Marques

## 2020-03-25 NOTE — TELEPHONE ENCOUNTER
I talked to pt about her U/S  She seemed upset that nothing showed up  She said she has had this "bump" for over a year, what is it? What's next?

## 2020-03-25 NOTE — TELEPHONE ENCOUNTER
Patient was very irate over the phone I kindly explained that you suggest she be referred to a dermatologist and she she got very upset, she did not allow me to even say anything, she just started screaming stating her head hurts and she can feel something there and she would like to speak with the doctor regarding this

## 2020-03-25 NOTE — TELEPHONE ENCOUNTER
----- Message from Killian Rodriguez DO sent at 3/25/2020 11:57 AM EDT -----  Call patient    Ultrasound of the scalp shows no abnormality

## 2020-03-26 ENCOUNTER — TELEPHONE (OUTPATIENT)
Dept: RADIOLOGY | Facility: HOSPITAL | Age: 60
End: 2020-03-26

## 2020-03-30 ENCOUNTER — HOSPITAL ENCOUNTER (OUTPATIENT)
Dept: MRI IMAGING | Facility: HOSPITAL | Age: 60
Discharge: HOME | End: 2020-03-30
Payer: COMMERCIAL

## 2020-03-30 DIAGNOSIS — D33.3 ACOUSTIC NEUROMA (HCC): ICD-10-CM

## 2020-03-30 DIAGNOSIS — R42 DIZZINESS: ICD-10-CM

## 2020-03-30 DIAGNOSIS — M31.6 TEMPORAL ARTERITIS (HCC): ICD-10-CM

## 2020-03-30 DIAGNOSIS — R90.89 ABNORMAL BRAIN MRI: ICD-10-CM

## 2020-03-30 DIAGNOSIS — R22.0 SCALP MASS: ICD-10-CM

## 2020-03-30 PROCEDURE — A9585 GADOBUTROL INJECTION: HCPCS | Performed by: FAMILY MEDICINE

## 2020-03-30 PROCEDURE — 70553 MRI BRAIN STEM W/O & W/DYE: CPT

## 2020-03-30 RX ADMIN — GADOBUTROL 5 ML: 604.72 INJECTION INTRAVENOUS at 10:43

## 2020-03-31 DIAGNOSIS — R51.9 FACIAL PAIN: Primary | ICD-10-CM

## 2020-04-14 ENCOUNTER — TELEMEDICINE (OUTPATIENT)
Dept: FAMILY MEDICINE CLINIC | Facility: CLINIC | Age: 60
End: 2020-04-14
Payer: COMMERCIAL

## 2020-04-14 VITALS — TEMPERATURE: 98.1 F

## 2020-04-14 DIAGNOSIS — M54.12 CERVICAL RADICULOPATHY: Primary | ICD-10-CM

## 2020-04-14 DIAGNOSIS — G44.52 NEW DAILY PERSISTENT HEADACHE: ICD-10-CM

## 2020-04-14 DIAGNOSIS — E55.9 VITAMIN D DEFICIENCY: ICD-10-CM

## 2020-04-14 DIAGNOSIS — H92.01 RIGHT EAR PAIN: ICD-10-CM

## 2020-04-14 DIAGNOSIS — M54.2 NECK PAIN: ICD-10-CM

## 2020-04-14 DIAGNOSIS — D33.3 ACOUSTIC NEUROMA (HCC): ICD-10-CM

## 2020-04-14 PROCEDURE — 99214 OFFICE O/P EST MOD 30 MIN: CPT | Performed by: FAMILY MEDICINE

## 2020-04-14 RX ORDER — DICLOFENAC SODIUM 75 MG/1
75 TABLET, DELAYED RELEASE ORAL 2 TIMES DAILY
Qty: 60 TABLET | Refills: 1 | Status: SHIPPED | OUTPATIENT
Start: 2020-04-14 | End: 2021-10-26

## 2020-04-16 ENCOUNTER — TELEPHONE (OUTPATIENT)
Dept: NEUROLOGY | Facility: CLINIC | Age: 60
End: 2020-04-16

## 2020-04-21 ENCOUNTER — TELEPHONE (OUTPATIENT)
Dept: NEUROLOGY | Facility: CLINIC | Age: 60
End: 2020-04-21

## 2020-04-21 ENCOUNTER — TELEMEDICINE (OUTPATIENT)
Dept: NEUROLOGY | Facility: CLINIC | Age: 60
End: 2020-04-21
Payer: COMMERCIAL

## 2020-04-21 DIAGNOSIS — G44.52 NEW DAILY PERSISTENT HEADACHE: Primary | ICD-10-CM

## 2020-04-21 DIAGNOSIS — M54.2 CERVICALGIA: ICD-10-CM

## 2020-04-21 PROCEDURE — 99204 OFFICE O/P NEW MOD 45 MIN: CPT | Performed by: PSYCHIATRY & NEUROLOGY

## 2020-04-28 ENCOUNTER — DOCUMENTATION (OUTPATIENT)
Dept: FAMILY MEDICINE CLINIC | Facility: CLINIC | Age: 60
End: 2020-04-28

## 2020-04-28 ENCOUNTER — TELEPHONE (OUTPATIENT)
Dept: FAMILY MEDICINE CLINIC | Facility: CLINIC | Age: 60
End: 2020-04-28

## 2020-04-28 DIAGNOSIS — J01.90 ACUTE SINUSITIS, RECURRENCE NOT SPECIFIED, UNSPECIFIED LOCATION: Primary | ICD-10-CM

## 2020-04-28 RX ORDER — AMOXICILLIN AND CLAVULANATE POTASSIUM 875; 125 MG/1; MG/1
1 TABLET, FILM COATED ORAL EVERY 12 HOURS SCHEDULED
Qty: 14 TABLET | Refills: 0 | Status: SHIPPED | OUTPATIENT
Start: 2020-04-28 | End: 2020-05-05

## 2020-06-08 ENCOUNTER — HOSPITAL ENCOUNTER (OUTPATIENT)
Dept: MRI IMAGING | Facility: HOSPITAL | Age: 60
Discharge: HOME | End: 2020-06-08
Payer: COMMERCIAL

## 2020-06-08 DIAGNOSIS — M54.12 CERVICAL RADICULOPATHY: ICD-10-CM

## 2020-06-08 PROCEDURE — 72141 MRI NECK SPINE W/O DYE: CPT

## 2020-11-02 ENCOUNTER — HOSPITAL ENCOUNTER (OUTPATIENT)
Dept: MRI IMAGING | Facility: HOSPITAL | Age: 60
Discharge: HOME | End: 2020-11-02
Attending: NEUROLOGICAL SURGERY
Payer: COMMERCIAL

## 2020-11-02 DIAGNOSIS — D33.3 ACOUSTIC NEUROMA (HCC): ICD-10-CM

## 2020-11-02 PROCEDURE — 70553 MRI BRAIN STEM W/O & W/DYE: CPT

## 2020-11-02 PROCEDURE — A9585 GADOBUTROL INJECTION: HCPCS | Performed by: NEUROLOGICAL SURGERY

## 2020-11-02 RX ADMIN — GADOBUTROL 5 ML: 604.72 INJECTION INTRAVENOUS at 13:49

## 2020-11-13 ENCOUNTER — TELEMEDICINE (OUTPATIENT)
Dept: NEUROSURGERY | Facility: CLINIC | Age: 60
End: 2020-11-13
Payer: COMMERCIAL

## 2020-11-13 DIAGNOSIS — D33.3 ACOUSTIC NEUROMA (HCC): Primary | ICD-10-CM

## 2020-11-13 PROCEDURE — 99214 OFFICE O/P EST MOD 30 MIN: CPT | Performed by: PHYSICIAN ASSISTANT

## 2020-11-13 PROCEDURE — 1036F TOBACCO NON-USER: CPT | Performed by: PHYSICIAN ASSISTANT

## 2021-01-19 ENCOUNTER — VBI (OUTPATIENT)
Dept: ADMINISTRATIVE | Facility: OTHER | Age: 61
End: 2021-01-19

## 2021-02-03 ENCOUNTER — VBI (OUTPATIENT)
Dept: ADMINISTRATIVE | Facility: OTHER | Age: 61
End: 2021-02-03

## 2021-02-10 ENCOUNTER — VBI (OUTPATIENT)
Dept: ADMINISTRATIVE | Facility: OTHER | Age: 61
End: 2021-02-10

## 2021-08-11 ENCOUNTER — OFFICE VISIT (OUTPATIENT)
Dept: FAMILY MEDICINE CLINIC | Facility: CLINIC | Age: 61
End: 2021-08-11
Payer: COMMERCIAL

## 2021-08-11 VITALS
WEIGHT: 106 LBS | BODY MASS INDEX: 17.66 KG/M2 | SYSTOLIC BLOOD PRESSURE: 100 MMHG | DIASTOLIC BLOOD PRESSURE: 64 MMHG | TEMPERATURE: 97.5 F | HEIGHT: 65 IN

## 2021-08-11 DIAGNOSIS — Z12.9 CANCER SCREENING: Primary | ICD-10-CM

## 2021-08-11 DIAGNOSIS — H65.23 BILATERAL CHRONIC SEROUS OTITIS MEDIA: ICD-10-CM

## 2021-08-11 DIAGNOSIS — E55.9 VITAMIN D DEFICIENCY: ICD-10-CM

## 2021-08-11 DIAGNOSIS — Z00.00 ROUTINE ADULT HEALTH MAINTENANCE: ICD-10-CM

## 2021-08-11 PROBLEM — H65.20 CHRONIC OTITIS MEDIA WITH SEROUS EFFUSION: Status: ACTIVE | Noted: 2021-08-11

## 2021-08-11 PROBLEM — M19.90 ARTHRITIS: Status: ACTIVE | Noted: 2021-08-11

## 2021-08-11 PROBLEM — M20.21 HALLUX RIGIDUS OF RIGHT FOOT: Status: ACTIVE | Noted: 2020-10-02

## 2021-08-11 PROBLEM — M71.571 OTHER BURSITIS, NOT ELSEWHERE CLASSIFIED, RIGHT ANKLE AND FOOT: Status: ACTIVE | Noted: 2020-10-02

## 2021-08-11 PROBLEM — R20.0 NUMBNESS OF FOOT: Status: ACTIVE | Noted: 2021-08-11

## 2021-08-11 PROBLEM — M31.6 TEMPORAL ARTERITIS (HCC): Status: RESOLVED | Noted: 2020-03-20 | Resolved: 2021-08-11

## 2021-08-11 PROCEDURE — 3725F SCREEN DEPRESSION PERFORMED: CPT | Performed by: FAMILY MEDICINE

## 2021-08-11 PROCEDURE — 3008F BODY MASS INDEX DOCD: CPT | Performed by: FAMILY MEDICINE

## 2021-08-11 PROCEDURE — 99213 OFFICE O/P EST LOW 20 MIN: CPT | Performed by: FAMILY MEDICINE

## 2021-08-11 PROCEDURE — 99396 PREV VISIT EST AGE 40-64: CPT | Performed by: FAMILY MEDICINE

## 2021-08-11 PROCEDURE — 1036F TOBACCO NON-USER: CPT | Performed by: FAMILY MEDICINE

## 2021-08-11 RX ORDER — LORATADINE 10 MG/1
10 TABLET ORAL DAILY
Qty: 90 TABLET | Refills: 1 | Status: SHIPPED | OUTPATIENT
Start: 2021-08-11 | End: 2021-10-26

## 2021-08-11 RX ORDER — FLUTICASONE PROPIONATE 50 MCG
2 SPRAY, SUSPENSION (ML) NASAL DAILY
Qty: 16 G | Refills: 0 | Status: SHIPPED | OUTPATIENT
Start: 2021-08-11 | End: 2021-10-26

## 2021-08-16 PROBLEM — Z00.00 ROUTINE ADULT HEALTH MAINTENANCE: Status: ACTIVE | Noted: 2021-08-16

## 2021-08-16 NOTE — PROGRESS NOTES
Assessment/Plan:    53-year-old woman with: Serous TM effusion encouraged beginning Flonase discussed supportive care return parameters otherwise    No problem-specific Assessment & Plan notes found for this encounter  Diagnoses and all orders for this visit:    Cancer screening  -     Mammo screening bilateral w cad; Future  -     Ambulatory referral for colonoscopy; Future    Vitamin D deficiency  -     Cholecalciferol (Vitamin D3) 1 25 MG (93560 UT) TABS; Take 1 tablet (50,000 Units total) by mouth once a week    Bilateral chronic serous otitis media  -     loratadine (CLARITIN) 10 mg tablet; Take 1 tablet (10 mg total) by mouth daily  -     fluticasone (FLONASE) 50 mcg/act nasal spray; 2 sprays into each nostril daily          Subjective:     Chief Complaint   Patient presents with    Earache     Patient is complaining of ear pressure and discomfort   Well Check     Annual physical and bmi        Patient ID: Yoselyn Quinn is a 63 y o  female  Patient is a 53-year-old woman who presents for follow-up on ear pain for several days no fevers chills nausea vomiting tolerating p o  intake      The following portions of the patient's history were reviewed and updated as appropriate: allergies, current medications, past family history, past medical history, past social history, past surgical history and problem list     Review of Systems   Constitutional: Negative  HENT: Positive for ear pain  Eyes: Negative  Respiratory: Negative  Cardiovascular: Negative  Gastrointestinal: Negative  Endocrine: Negative  Genitourinary: Negative  Musculoskeletal: Negative  Allergic/Immunologic: Negative  Neurological: Negative  Hematological: Negative  Psychiatric/Behavioral: Negative  All other systems reviewed and are negative          Objective:      /64 (BP Location: Right arm, Patient Position: Sitting, Cuff Size: Adult)   Temp 97 5 °F (36 4 °C) (Tympanic)   Ht 5' 5" (1 651 m)   Wt 48 1 kg (106 lb)   BMI 17 64 kg/m²          Physical Exam  Constitutional:       Appearance: She is well-developed  HENT:      Head: Atraumatic  Right Ear: External ear normal       Left Ear: External ear normal    Eyes:      Conjunctiva/sclera: Conjunctivae normal       Pupils: Pupils are equal, round, and reactive to light  Cardiovascular:      Rate and Rhythm: Normal rate and regular rhythm  Heart sounds: Normal heart sounds  Pulmonary:      Effort: Pulmonary effort is normal  No respiratory distress  Breath sounds: Normal breath sounds  Abdominal:      General: Bowel sounds are normal  There is no distension  Palpations: Abdomen is soft  Tenderness: There is no abdominal tenderness  There is no guarding or rebound  Musculoskeletal:         General: Normal range of motion  Cervical back: Normal range of motion  Skin:     General: Skin is warm and dry  Neurological:      Mental Status: She is alert and oriented to person, place, and time  Cranial Nerves: No cranial nerve deficit  Psychiatric:         Behavior: Behavior normal          Thought Content: Thought content normal          Judgment: Judgment normal          BMI Counseling: Body mass index is 17 64 kg/m²  The BMI is below normal  Patient advised to gain weight

## 2021-08-16 NOTE — PROGRESS NOTES
Assessment/Plan:    70-year-old woman with: Annual visit encouraged healthy diet exercise ample sleep stress reduction strategies healthy weight as tolerated discussed supportive care return parameters  mammogram and referral for colon screening ordered    No problem-specific Assessment & Plan notes found for this encounter  Diagnoses and all orders for this visit:    Cancer screening  -     Mammo screening bilateral w cad; Future  -     Ambulatory referral for colonoscopy; Future    Vitamin D deficiency  -     Cholecalciferol (Vitamin D3) 1 25 MG (67654 UT) TABS; Take 1 tablet (50,000 Units total) by mouth once a week    Bilateral chronic serous otitis media  -     loratadine (CLARITIN) 10 mg tablet; Take 1 tablet (10 mg total) by mouth daily  -     fluticasone (FLONASE) 50 mcg/act nasal spray; 2 sprays into each nostril daily    Routine adult health maintenance          Subjective:     Chief Complaint   Patient presents with    Earache     Patient is complaining of ear pressure and discomfort   Well Check     Annual physical and bmi        Patient ID: Janae Rowell is a 63 y o  female  Patient is a 70-year-old woman who presents for an annual well visit she admits she is physically active generally eats healthy diet she sleeps well no other health maintenance issues      The following portions of the patient's history were reviewed and updated as appropriate: allergies, current medications, past family history, past medical history, past social history, past surgical history and problem list     Review of Systems   Constitutional: Negative  HENT: Positive for ear pain  Eyes: Negative  Respiratory: Negative  Cardiovascular: Negative  Gastrointestinal: Negative  Endocrine: Negative  Genitourinary: Negative  Musculoskeletal: Negative  Allergic/Immunologic: Negative  Neurological: Negative  Hematological: Negative  Psychiatric/Behavioral: Negative      All other systems reviewed and are negative  Objective:      /64 (BP Location: Right arm, Patient Position: Sitting, Cuff Size: Adult)   Temp 97 5 °F (36 4 °C) (Tympanic)   Ht 5' 5" (1 651 m)   Wt 48 1 kg (106 lb)   BMI 17 64 kg/m²          Physical Exam  Constitutional:       Appearance: She is well-developed  HENT:      Head: Atraumatic  Right Ear: External ear normal       Left Ear: External ear normal    Eyes:      Conjunctiva/sclera: Conjunctivae normal       Pupils: Pupils are equal, round, and reactive to light  Cardiovascular:      Rate and Rhythm: Normal rate and regular rhythm  Heart sounds: Normal heart sounds  Pulmonary:      Effort: Pulmonary effort is normal  No respiratory distress  Breath sounds: Normal breath sounds  Abdominal:      General: Bowel sounds are normal  There is no distension  Palpations: Abdomen is soft  Tenderness: There is no abdominal tenderness  There is no guarding or rebound  Musculoskeletal:         General: Normal range of motion  Cervical back: Normal range of motion  Skin:     General: Skin is warm and dry  Neurological:      Mental Status: She is alert and oriented to person, place, and time  Cranial Nerves: No cranial nerve deficit  Psychiatric:         Behavior: Behavior normal          Thought Content:  Thought content normal          Judgment: Judgment normal

## 2021-10-26 ENCOUNTER — OFFICE VISIT (OUTPATIENT)
Dept: FAMILY MEDICINE CLINIC | Facility: CLINIC | Age: 61
End: 2021-10-26
Payer: COMMERCIAL

## 2021-10-26 ENCOUNTER — TELEPHONE (OUTPATIENT)
Dept: FAMILY MEDICINE CLINIC | Facility: CLINIC | Age: 61
End: 2021-10-26

## 2021-10-26 VITALS
BODY MASS INDEX: 17.66 KG/M2 | OXYGEN SATURATION: 97 % | DIASTOLIC BLOOD PRESSURE: 72 MMHG | HEIGHT: 65 IN | WEIGHT: 106 LBS | SYSTOLIC BLOOD PRESSURE: 120 MMHG | TEMPERATURE: 97 F | HEART RATE: 80 BPM

## 2021-10-26 DIAGNOSIS — E44.1 MILD PROTEIN-CALORIE MALNUTRITION (HCC): ICD-10-CM

## 2021-10-26 DIAGNOSIS — J01.00 ACUTE NON-RECURRENT MAXILLARY SINUSITIS: ICD-10-CM

## 2021-10-26 DIAGNOSIS — K59.01 SLOW TRANSIT CONSTIPATION: ICD-10-CM

## 2021-10-26 DIAGNOSIS — S05.02XA ABRASION OF LEFT CORNEA, INITIAL ENCOUNTER: Primary | ICD-10-CM

## 2021-10-26 DIAGNOSIS — D33.3 ACOUSTIC NEUROMA (HCC): ICD-10-CM

## 2021-10-26 PROCEDURE — 3008F BODY MASS INDEX DOCD: CPT | Performed by: FAMILY MEDICINE

## 2021-10-26 PROCEDURE — 99213 OFFICE O/P EST LOW 20 MIN: CPT | Performed by: FAMILY MEDICINE

## 2021-10-26 PROCEDURE — 1036F TOBACCO NON-USER: CPT | Performed by: FAMILY MEDICINE

## 2021-10-26 RX ORDER — TRAMADOL HYDROCHLORIDE 50 MG/1
TABLET ORAL
COMMUNITY

## 2021-10-26 RX ORDER — OFLOXACIN 3 MG/ML
1 SOLUTION/ DROPS OPHTHALMIC 3 TIMES DAILY
Qty: 5 ML | Refills: 0 | Status: SHIPPED | OUTPATIENT
Start: 2021-10-26

## 2021-10-26 RX ORDER — LINACLOTIDE 145 UG/1
1 CAPSULE, GELATIN COATED ORAL DAILY
Qty: 90 CAPSULE | Refills: 1 | Status: SHIPPED | OUTPATIENT
Start: 2021-10-26

## 2021-10-26 RX ORDER — OXYCODONE HYDROCHLORIDE 5 MG/1
5 TABLET ORAL 2 TIMES DAILY PRN
COMMUNITY
Start: 2021-09-13

## 2021-10-26 RX ORDER — AMOXICILLIN AND CLAVULANATE POTASSIUM 875; 125 MG/1; MG/1
1 TABLET, FILM COATED ORAL EVERY 12 HOURS SCHEDULED
Qty: 14 TABLET | Refills: 0 | Status: SHIPPED | OUTPATIENT
Start: 2021-10-26 | End: 2021-11-02

## 2021-10-26 RX ORDER — ERGOCALCIFEROL (VITAMIN D2) 1250 MCG
CAPSULE ORAL
COMMUNITY

## 2021-11-04 ENCOUNTER — TELEPHONE (OUTPATIENT)
Dept: NEUROSURGERY | Facility: CLINIC | Age: 61
End: 2021-11-04

## 2021-11-08 ENCOUNTER — OFFICE VISIT (OUTPATIENT)
Dept: AUDIOLOGY | Age: 61
End: 2021-11-08
Payer: COMMERCIAL

## 2021-11-08 DIAGNOSIS — H90.3 SENSORY HEARING LOSS, BILATERAL: Primary | ICD-10-CM

## 2021-11-08 PROCEDURE — 92567 TYMPANOMETRY: CPT | Performed by: AUDIOLOGIST-HEARING AID FITTER

## 2021-11-08 PROCEDURE — 92557 COMPREHENSIVE HEARING TEST: CPT | Performed by: AUDIOLOGIST-HEARING AID FITTER

## 2021-11-09 ENCOUNTER — HOSPITAL ENCOUNTER (OUTPATIENT)
Dept: MRI IMAGING | Facility: HOSPITAL | Age: 61
Discharge: HOME | End: 2021-11-09
Payer: COMMERCIAL

## 2021-11-09 DIAGNOSIS — D33.3 ACOUSTIC NEUROMA (HCC): ICD-10-CM

## 2021-11-09 PROCEDURE — A9585 GADOBUTROL INJECTION: HCPCS | Performed by: PHYSICIAN ASSISTANT

## 2021-11-09 PROCEDURE — 70553 MRI BRAIN STEM W/O & W/DYE: CPT

## 2021-11-09 PROCEDURE — G1004 CDSM NDSC: HCPCS

## 2021-11-09 RX ADMIN — GADOBUTROL 5 ML: 604.72 INJECTION INTRAVENOUS at 11:20

## 2021-11-15 ENCOUNTER — OFFICE VISIT (OUTPATIENT)
Dept: NEUROSURGERY | Facility: CLINIC | Age: 61
End: 2021-11-15
Payer: COMMERCIAL

## 2021-11-15 VITALS
RESPIRATION RATE: 16 BRPM | BODY MASS INDEX: 17.16 KG/M2 | DIASTOLIC BLOOD PRESSURE: 81 MMHG | TEMPERATURE: 98.9 F | SYSTOLIC BLOOD PRESSURE: 130 MMHG | WEIGHT: 103 LBS | HEART RATE: 88 BPM | HEIGHT: 65 IN

## 2021-11-15 DIAGNOSIS — D33.3 ACOUSTIC NEUROMA (HCC): Primary | ICD-10-CM

## 2021-11-15 DIAGNOSIS — R51.9 CHRONIC HEADACHES: ICD-10-CM

## 2021-11-15 DIAGNOSIS — G89.29 CHRONIC HEADACHES: ICD-10-CM

## 2021-11-15 PROCEDURE — 99214 OFFICE O/P EST MOD 30 MIN: CPT | Performed by: NEUROLOGICAL SURGERY

## 2021-11-15 PROCEDURE — 1036F TOBACCO NON-USER: CPT | Performed by: NEUROLOGICAL SURGERY

## 2021-11-15 PROCEDURE — 3008F BODY MASS INDEX DOCD: CPT | Performed by: NEUROLOGICAL SURGERY

## 2021-11-15 RX ORDER — IBUPROFEN 200 MG
400 TABLET ORAL DAILY
COMMUNITY

## 2021-12-29 ENCOUNTER — CONSULT (OUTPATIENT)
Dept: GASTROENTEROLOGY | Facility: MEDICAL CENTER | Age: 61
End: 2021-12-29
Payer: COMMERCIAL

## 2021-12-29 VITALS
DIASTOLIC BLOOD PRESSURE: 70 MMHG | SYSTOLIC BLOOD PRESSURE: 115 MMHG | TEMPERATURE: 97.5 F | WEIGHT: 102 LBS | HEART RATE: 82 BPM | BODY MASS INDEX: 16.97 KG/M2

## 2021-12-29 DIAGNOSIS — Z12.9 CANCER SCREENING: ICD-10-CM

## 2021-12-29 DIAGNOSIS — Z12.11 COLON CANCER SCREENING: Primary | ICD-10-CM

## 2021-12-29 DIAGNOSIS — K64.9 HEMORRHOIDS, UNSPECIFIED HEMORRHOID TYPE: ICD-10-CM

## 2021-12-29 DIAGNOSIS — K59.04 CHRONIC IDIOPATHIC CONSTIPATION: ICD-10-CM

## 2021-12-29 PROCEDURE — 99204 OFFICE O/P NEW MOD 45 MIN: CPT | Performed by: PHYSICIAN ASSISTANT

## 2021-12-29 RX ORDER — HYDROCORTISONE 25 MG/G
CREAM TOPICAL 2 TIMES DAILY
Qty: 28 G | Refills: 3 | Status: SHIPPED | OUTPATIENT
Start: 2021-12-29

## 2021-12-29 RX ORDER — HYDROCORTISONE ACETATE 25 MG/1
25 SUPPOSITORY RECTAL 2 TIMES DAILY
Qty: 12 SUPPOSITORY | Refills: 3 | Status: SHIPPED | OUTPATIENT
Start: 2021-12-29

## 2022-02-01 ENCOUNTER — TELEPHONE (OUTPATIENT)
Dept: GASTROENTEROLOGY | Facility: CLINIC | Age: 62
End: 2022-02-01

## 2022-02-01 NOTE — TELEPHONE ENCOUNTER
Patient called office asking about a sooner procedure time  I was able to get her moved up to 2/11/22 with Dr Lucho Copeland

## 2022-02-01 NOTE — TELEPHONE ENCOUNTER
Patients GI provider:  Dr Josie Varghese    Number to return call: [phone]    Reason for call: Pt calling wanting to know if she can be sched for a sooner procedure date or put on a cancellation list? Pt states she continues to be constipated and is taking laxative every day  Pt states she is very worried did not want to wait unti the end of March      Scheduled procedure/appointment date if applicable: Procedure - 03/30/22

## 2022-02-08 ENCOUNTER — TELEPHONE (OUTPATIENT)
Dept: ADMINISTRATIVE | Facility: OTHER | Age: 62
End: 2022-02-08

## 2022-02-08 NOTE — TELEPHONE ENCOUNTER
Upon review of the In Basket request we were able to locate, review, and update the patient chart as requested for CRC: Colonoscopy  Any additional questions or concerns should be emailed to the Practice Liaisons via [email or web address] email, please do not reply via In Basket      Thank you  Wendy Salazar

## 2022-02-08 NOTE — TELEPHONE ENCOUNTER
----- Message from Nida Zamora sent at 2/8/2022 11:25 AM EST -----  Regarding: CARE GAP REQUEST  Contact: [phone]  02/08/22 11:25 AM    Hello, our patient Nazario Garcia has had CRC: Colonoscopy completed/performed  Please assist in updating the patient chart by pulling the Care Everywhere (CE) document  The date of service is 2/8/22       Thank you,  Nida Zamora  [facility]

## 2022-02-10 ENCOUNTER — TELEPHONE (OUTPATIENT)
Dept: GASTROENTEROLOGY | Facility: MEDICAL CENTER | Age: 62
End: 2022-02-10

## 2022-05-18 ENCOUNTER — OFFICE VISIT (OUTPATIENT)
Dept: FAMILY MEDICINE CLINIC | Facility: CLINIC | Age: 62
End: 2022-05-18
Payer: COMMERCIAL

## 2022-05-18 VITALS
BODY MASS INDEX: 17.69 KG/M2 | WEIGHT: 106.2 LBS | HEART RATE: 86 BPM | SYSTOLIC BLOOD PRESSURE: 120 MMHG | TEMPERATURE: 98.1 F | DIASTOLIC BLOOD PRESSURE: 80 MMHG | OXYGEN SATURATION: 98 % | HEIGHT: 65 IN

## 2022-05-18 DIAGNOSIS — G43.019 INTRACTABLE MIGRAINE WITHOUT AURA AND WITHOUT STATUS MIGRAINOSUS: Primary | ICD-10-CM

## 2022-05-18 DIAGNOSIS — D33.3 ACOUSTIC NEUROMA (HCC): ICD-10-CM

## 2022-05-18 DIAGNOSIS — R90.89 ABNORMAL BRAIN MRI: ICD-10-CM

## 2022-05-18 DIAGNOSIS — G44.201 INTRACTABLE TENSION-TYPE HEADACHE, UNSPECIFIED CHRONICITY PATTERN: ICD-10-CM

## 2022-05-18 DIAGNOSIS — E55.9 VITAMIN D DEFICIENCY: ICD-10-CM

## 2022-05-18 DIAGNOSIS — F11.20 CONTINUOUS OPIOID DEPENDENCE (HCC): ICD-10-CM

## 2022-05-18 DIAGNOSIS — R22.0 SWELLING OF SCALP: ICD-10-CM

## 2022-05-18 PROCEDURE — 3008F BODY MASS INDEX DOCD: CPT | Performed by: FAMILY MEDICINE

## 2022-05-18 PROCEDURE — 99214 OFFICE O/P EST MOD 30 MIN: CPT | Performed by: FAMILY MEDICINE

## 2022-05-18 PROCEDURE — 1036F TOBACCO NON-USER: CPT | Performed by: FAMILY MEDICINE

## 2022-05-18 NOTE — PROGRESS NOTES
Assessment/Plan:       Diagnoses and all orders for this visit:    Intractable migraine without aura and without status migrainosus  -     Ubrogepant (UBRELVY) 50 MG tablet; Take 1 tablet (50 mg) one time as needed for migraine  May repeat one additional tablet (50 mg) at least two hours after the first dose  Do not use more than two doses per day, or for more than eight days per month  -     CBC and differential; Future  -     Comprehensive metabolic panel; Future  -     Sedimentation rate, automated; Future  -     C-reactive protein; Future  -     Ambulatory Referral to Neurology; Future    Vitamin D deficiency  -     Cholecalciferol (Vitamin D3) 1 25 MG (47294 UT) TABS; Take 1 tablet (50,000 Units total) by mouth once a week    Intractable tension-type headache, unspecified chronicity pattern    Abnormal brain MRI  -     CBC and differential; Future  -     Comprehensive metabolic panel; Future  -     Sedimentation rate, automated; Future  -     C-reactive protein; Future    Acoustic neuroma (HCC)    Continuous opioid dependence (HCC)    Swelling of scalp  -     US head neck soft tissue; Future            Subjective:        Patient ID: Ena Perez is a 57 y o  female  Patient is here with temporal pain the left  For 1-2 days  Patient has had MRI of the brain last year which was reviewed at this time  Patient did see Neurosurgery  Patient with right acoustic  No change in vision  Patient does get headaches intermittently  Patient's pain improves with palpation of the temporal region bilaterally  Patient does get photophobia  No trauma noted  No medications use  The following portions of the patient's history were reviewed and updated as appropriate: allergies, current medications, past family history, past medical history, past social history, past surgical history and problem list       Review of Systems   Constitutional: Negative  HENT: Negative  Eyes: Positive for photophobia  Negative for visual disturbance  Respiratory: Negative  Cardiovascular: Negative  Gastrointestinal: Negative  Endocrine: Negative  Genitourinary: Negative  Musculoskeletal: Negative  Skin: Negative  Allergic/Immunologic: Negative  Neurological: Positive for headaches  Hematological: Negative  Psychiatric/Behavioral: Negative  Objective:      BMI Counseling: Body mass index is 17 67 kg/m²  The BMI is below normal  Patient advised to gain weight  Rationale for BMI follow-up plan is due to patient being underweight  /80 (BP Location: Right arm, Patient Position: Sitting, Cuff Size: Adult)   Pulse 86   Temp 98 1 °F (36 7 °C) (Tympanic)   Ht 5' 5" (1 651 m)   Wt 48 2 kg (106 lb 3 2 oz)   SpO2 98%   BMI 17 67 kg/m²          Physical Exam  Vitals and nursing note reviewed  Constitutional:       General: She is not in acute distress  Appearance: Normal appearance  She is not ill-appearing, toxic-appearing or diaphoretic  HENT:      Head: Normocephalic and atraumatic  Right Ear: Tympanic membrane, ear canal and external ear normal  There is no impacted cerumen  Left Ear: Tympanic membrane, ear canal and external ear normal  There is no impacted cerumen  Nose: Nose normal  No congestion or rhinorrhea  Mouth/Throat:      Mouth: Mucous membranes are moist       Pharynx: No oropharyngeal exudate or posterior oropharyngeal erythema  Eyes:      General: No scleral icterus  Right eye: No discharge  Left eye: No discharge  Extraocular Movements: Extraocular movements intact  Conjunctiva/sclera: Conjunctivae normal       Pupils: Pupils are equal, round, and reactive to light  Neck:      Vascular: No carotid bruit  Cardiovascular:      Rate and Rhythm: Normal rate and regular rhythm  Pulses: Normal pulses  Heart sounds: Normal heart sounds  No murmur heard  No friction rub  No gallop     Pulmonary: Effort: Pulmonary effort is normal  No respiratory distress  Breath sounds: Normal breath sounds  No stridor  No wheezing, rhonchi or rales  Chest:      Chest wall: No tenderness  Musculoskeletal:         General: No swelling, tenderness, deformity or signs of injury  Normal range of motion  Cervical back: Normal range of motion and neck supple  No rigidity  No muscular tenderness  Right lower leg: No edema  Left lower leg: No edema  Lymphadenopathy:      Cervical: No cervical adenopathy  Skin:     General: Skin is warm and dry  Capillary Refill: Capillary refill takes less than 2 seconds  Coloration: Skin is not jaundiced  Findings: No bruising, erythema, lesion or rash  Neurological:      Mental Status: She is alert and oriented to person, place, and time  Mental status is at baseline  Cranial Nerves: No cranial nerve deficit  Sensory: No sensory deficit  Motor: No weakness  Coordination: Coordination normal       Gait: Gait normal    Psychiatric:         Mood and Affect: Mood normal          Behavior: Behavior normal          Thought Content:  Thought content normal          Judgment: Judgment normal

## 2022-05-19 LAB
ALBUMIN SERPL-MCNC: 4.6 G/DL (ref 3.6–5.1)
ALBUMIN/GLOB SERPL: 1.7 (CALC) (ref 1–2.5)
ALP SERPL-CCNC: 94 U/L (ref 37–153)
ALT SERPL-CCNC: 40 U/L (ref 6–29)
AST SERPL-CCNC: 38 U/L (ref 10–35)
BASOPHILS # BLD AUTO: 41 CELLS/UL (ref 0–200)
BASOPHILS NFR BLD AUTO: 0.5 %
BILIRUB SERPL-MCNC: 0.5 MG/DL (ref 0.2–1.2)
BUN SERPL-MCNC: 17 MG/DL (ref 7–25)
BUN/CREAT SERPL: ABNORMAL (CALC) (ref 6–22)
CALCIUM SERPL-MCNC: 9.4 MG/DL (ref 8.6–10.4)
CHLORIDE SERPL-SCNC: 103 MMOL/L (ref 98–110)
CO2 SERPL-SCNC: 28 MMOL/L (ref 20–32)
CREAT SERPL-MCNC: 0.73 MG/DL (ref 0.5–0.99)
CRP SERPL-MCNC: 0.4 MG/L
EOSINOPHIL # BLD AUTO: 81 CELLS/UL (ref 15–500)
EOSINOPHIL NFR BLD AUTO: 1 %
ERYTHROCYTE [DISTWIDTH] IN BLOOD BY AUTOMATED COUNT: 11.5 % (ref 11–15)
ERYTHROCYTE [SEDIMENTATION RATE] IN BLOOD BY WESTERGREN METHOD: 2 MM/H
GLOBULIN SER CALC-MCNC: 2.7 G/DL (CALC) (ref 1.9–3.7)
GLUCOSE SERPL-MCNC: 116 MG/DL (ref 65–139)
HCT VFR BLD AUTO: 43.2 % (ref 35–45)
HGB BLD-MCNC: 14.4 G/DL (ref 11.7–15.5)
LYMPHOCYTES # BLD AUTO: 2940 CELLS/UL (ref 850–3900)
LYMPHOCYTES NFR BLD AUTO: 36.3 %
MCH RBC QN AUTO: 31.2 PG (ref 27–33)
MCHC RBC AUTO-ENTMCNC: 33.3 G/DL (ref 32–36)
MCV RBC AUTO: 93.5 FL (ref 80–100)
MONOCYTES # BLD AUTO: 462 CELLS/UL (ref 200–950)
MONOCYTES NFR BLD AUTO: 5.7 %
NEUTROPHILS # BLD AUTO: 4577 CELLS/UL (ref 1500–7800)
NEUTROPHILS NFR BLD AUTO: 56.5 %
PLATELET # BLD AUTO: 301 THOUSAND/UL (ref 140–400)
PMV BLD REES-ECKER: 11.7 FL (ref 7.5–12.5)
POTASSIUM SERPL-SCNC: 4.1 MMOL/L (ref 3.5–5.3)
PROT SERPL-MCNC: 7.3 G/DL (ref 6.1–8.1)
RBC # BLD AUTO: 4.62 MILLION/UL (ref 3.8–5.1)
SL AMB EGFR AFRICAN AMERICAN: 102 ML/MIN/1.73M2
SL AMB EGFR NON AFRICAN AMERICAN: 88 ML/MIN/1.73M2
SODIUM SERPL-SCNC: 138 MMOL/L (ref 135–146)
WBC # BLD AUTO: 8.1 THOUSAND/UL (ref 3.8–10.8)

## 2022-06-13 ENCOUNTER — HOSPITAL ENCOUNTER (OUTPATIENT)
Dept: ULTRASOUND IMAGING | Facility: HOSPITAL | Age: 62
Discharge: HOME | End: 2022-06-13

## 2022-06-13 DIAGNOSIS — G43.019 INTRACTABLE MIGRAINE WITHOUT AURA AND WITHOUT STATUS MIGRAINOSUS: Primary | ICD-10-CM

## 2022-06-13 DIAGNOSIS — R90.89 ABNORMAL BRAIN MRI: ICD-10-CM

## 2022-06-13 DIAGNOSIS — R22.0 SCALP MASS: ICD-10-CM

## 2022-06-13 DIAGNOSIS — G44.201 INTRACTABLE TENSION-TYPE HEADACHE, UNSPECIFIED CHRONICITY PATTERN: ICD-10-CM

## 2022-06-13 DIAGNOSIS — R22.0 SWELLING OF SCALP: ICD-10-CM

## 2022-07-28 ENCOUNTER — TELEPHONE (OUTPATIENT)
Dept: FAMILY MEDICINE CLINIC | Facility: CLINIC | Age: 62
End: 2022-07-28

## 2022-07-28 DIAGNOSIS — G43.019 INTRACTABLE MIGRAINE WITHOUT AURA AND WITHOUT STATUS MIGRAINOSUS: Primary | ICD-10-CM

## 2022-07-28 DIAGNOSIS — G44.201 INTRACTABLE TENSION-TYPE HEADACHE, UNSPECIFIED CHRONICITY PATTERN: ICD-10-CM

## 2022-07-28 DIAGNOSIS — R22.0 SWELLING OF SCALP: ICD-10-CM

## 2022-07-28 DIAGNOSIS — R22.0 SCALP MASS: ICD-10-CM

## 2022-07-28 NOTE — TELEPHONE ENCOUNTER
Pt called regarding her ultrsound , she went to have it done and they explained to her that  She really needed a vascular us   I see an order put in, is this correct ? , I offered to make her appointment with them but she said that she would instead   She is very upset about how everything is being handled how she feels that know one is paying attention to her needs and she is having constant pain   I offered her an appointment  with dr Benito Cottrell and she said no because of how upset she is   She just wants answers and has been going through this for 2yrs

## 2022-07-28 NOTE — TELEPHONE ENCOUNTER
Spoke with Andrea Alejandro at vascular lab  Recommend ultrasound head neck for structural issues not vascular    Please resubmit ultrasound of the head and neck and schedule

## 2022-07-28 NOTE — TELEPHONE ENCOUNTER
In talking to this pt, she explained what is going on and the fact that she had an US of her head and neck 2 yrs ago  As we were talking I noticed that a vascular temporal artery duplex was ordered but never done  Since all her other tests were WNL, do you think it wise to order this?   Briana Aleman is at her wits end with this problem  Thanks

## 2022-07-28 NOTE — TELEPHONE ENCOUNTER
Order for temporal artery duplex ordered    Called [facility] to schedule - they will call the pt directly for an appt

## 2022-07-28 NOTE — TELEPHONE ENCOUNTER
Okay to order vascular temporal artery duplex ultrasound also even though her sed rate and CRP are normal

## 2022-07-28 NOTE — TELEPHONE ENCOUNTER
I called central scheduling , they transferred me to [facility] , then transferred me  to procedural  ultrasound , neither places  could set her up because they do not perform this particular us   Manager from us and of radiology  will be calling this office to help set this appointment ,  I will wait to call patient , since she is upset enough   Patient  doesn't care when am or pm , but can not schedule between 08/19-thru 08/24

## 2022-07-28 NOTE — TELEPHONE ENCOUNTER
Left merissage for pt to call back  She needs an US of head and neck    Order placed, just need to schedule when pt calls [facility]

## 2022-07-28 NOTE — TELEPHONE ENCOUNTER
I called pt , she was unable to make her ultrasound appointment because central scheduling kept sending her to the wrong extention ? I will make this appointment for her , she said no to ent because she feels as if there should be a purpose or dx that is definitive   She said ok to neurology referral    Pt has mri of the brain already scheduled for 11/15/22   Pt is highly upset , again I offered her an appointment with you and she said not yet

## 2022-07-28 NOTE — TELEPHONE ENCOUNTER
Call patient  Would suggest patient seeing ENT as well as referral to Neurology for headaches/pain    Will order MRI of the brain if desired by patient

## 2022-08-01 ENCOUNTER — HOSPITAL ENCOUNTER (OUTPATIENT)
Dept: NON INVASIVE DIAGNOSTICS | Facility: HOSPITAL | Age: 62
Discharge: HOME | End: 2022-08-01
Payer: COMMERCIAL

## 2022-08-01 DIAGNOSIS — R22.0 SWELLING OF SCALP: ICD-10-CM

## 2022-08-01 PROCEDURE — 93882 EXTRACRANIAL UNI/LTD STUDY: CPT

## 2022-08-01 PROCEDURE — 93880 EXTRACRANIAL BILAT STUDY: CPT | Performed by: SURGERY

## 2022-10-12 PROBLEM — Z00.00 ROUTINE ADULT HEALTH MAINTENANCE: Status: RESOLVED | Noted: 2021-08-16 | Resolved: 2022-10-12

## 2022-10-12 PROBLEM — J01.00 ACUTE NON-RECURRENT MAXILLARY SINUSITIS: Status: RESOLVED | Noted: 2021-10-26 | Resolved: 2022-10-12

## 2022-11-15 ENCOUNTER — HOSPITAL ENCOUNTER (OUTPATIENT)
Dept: MRI IMAGING | Facility: HOSPITAL | Age: 62
Discharge: HOME | End: 2022-11-15

## 2022-11-15 DIAGNOSIS — D33.3 ACOUSTIC NEUROMA (HCC): ICD-10-CM

## 2022-11-15 RX ADMIN — GADOBUTROL 5 ML: 604.72 INJECTION INTRAVENOUS at 14:26

## 2022-11-21 ENCOUNTER — OFFICE VISIT (OUTPATIENT)
Dept: NEUROSURGERY | Facility: CLINIC | Age: 62
End: 2022-11-21

## 2022-11-21 VITALS
TEMPERATURE: 97.4 F | SYSTOLIC BLOOD PRESSURE: 122 MMHG | DIASTOLIC BLOOD PRESSURE: 72 MMHG | BODY MASS INDEX: 17.09 KG/M2 | HEIGHT: 65 IN | WEIGHT: 102.6 LBS | RESPIRATION RATE: 19 BRPM | HEART RATE: 76 BPM | OXYGEN SATURATION: 96 %

## 2022-11-21 DIAGNOSIS — D33.3 ACOUSTIC NEUROMA (HCC): Primary | ICD-10-CM

## 2022-11-21 RX ORDER — PREGABALIN 150 MG/1
CAPSULE ORAL
COMMUNITY
Start: 2022-11-09

## 2022-11-21 RX ORDER — HYDROXYZINE PAMOATE 25 MG/1
CAPSULE ORAL
COMMUNITY
Start: 2022-11-14

## 2022-11-21 RX ORDER — OMEPRAZOLE 20 MG/1
CAPSULE, DELAYED RELEASE ORAL DAILY
COMMUNITY

## 2022-11-21 NOTE — ASSESSMENT & PLAN NOTE
Patient presents for annual surveillance of a known right acoustic neuroma (Koos grade I) as well as previously noted abnormality in the pituitary gland  · [facility]  Has been followed by [facility] since 2019  · Right Acoustic neuroma also noted on prior imaging from 2008  · Also with concern for pituitary lesion as noted from previous MRI imaging - not seen on recent MRIs  · Evaluated by audiologist Dr Parvez Mendoza 11/8/21 with normal hearing bilaterally  · Current exam non-focal      Imaging:  · MRI brain IAC w/wo 11/15/2022: A 3 mm enhancing focus within the mid right IAC most consistent with vestibulocochlear schwannoma, stable in multiple prior exams dating back 10/9/2008  Stable appearance of pituitary gland  Plan:  • Imaging reviewed in detail with patient  • Discussed that schwannoma remains stable in size  Since no impact on cranial nerves or hearing, would not recommend surgical resection or radiation at this time  • Continue follow up with audiology as directed  • Previously described pituitary lesion not apparent on recent MRIs  Pituitary gland note to be stable on recent MRIs  Will continue surveillance with MRIs  • Given stable acoustic neuroma and stable appearance of the pituitary gland as noted on MRIs dating back to 2008 and most recent annual MRIs 2019, 2020, 2021 and 2022, at this time pt could have follow up in 2-3 years  • Discussed recommendations with patient, pt was agreeable to follow up in 2 years with repeat MRI brain w/wo IACs

## 2022-11-21 NOTE — PROGRESS NOTES
Neurosurgery Office Note  Bhavna Patrick 60 y o  female MRN: [id number]      Assessment/Plan     Acoustic neuroma [facility])  Patient presents for annual surveillance of a known right acoustic neuroma (Koos grade I) as well as previously noted abnormality in the pituitary gland  · Was [facility]  Has been followed by [facility] since 2019  · Right Acoustic neuroma also noted on prior imaging from 2008  · Also with concern for pituitary lesion as noted from previous MRI imaging - not seen on recent MRIs  · Evaluated by audiologist Dr Zoila Maurice 11/8/21 with normal hearing bilaterally  · Current exam non-focal      Imaging:  · MRI brain IAC w/wo 11/15/2022: A 3 mm enhancing focus within the mid right IAC most consistent with vestibulocochlear schwannoma, stable in multiple prior exams dating back 10/9/2008  Stable appearance of pituitary gland  Plan:  • Imaging reviewed in detail with patient  • Discussed that schwannoma remains stable in size  Since no impact on cranial nerves or hearing, would not recommend surgical resection or radiation at this time  • Continue follow up with audiology as directed  • Previously described pituitary lesion not apparent on recent MRIs  Pituitary gland note to be stable on recent MRIs  Will continue surveillance with MRIs  • Given stable acoustic neuroma and stable appearance of the pituitary gland as noted on MRIs dating back to 2008 and most recent annual MRIs 2019, 2020, 2021 and 2022, at this time pt could have follow up in 2-3 years  • Discussed recommendations with patient, pt was agreeable to follow up in 2 years with repeat MRI brain w/wo IACs          Diagnoses and all orders for this visit:    Acoustic neuroma ([address] )  -     MRI brain IAC wo and w contrast; Future    Other orders  -     pregabalin (LYRICA) 150 mg capsule; take 1 capsule by mouth twice a day for NERVE PAIN, DISCONTINUE 100 MG  - omeprazole (PriLOSEC) 20 mg delayed release capsule; Daily  -     hydrOXYzine pamoate (VISTARIL) 25 mg capsule; take 1 to 2 capsules by mouth at bedtime if needed for anxiety EPISODES        I spent 45 minutes with the patient today in which >50% of the time was spent counseling/coordination of care regarding diagnosis, imaging review, symptoms and treatment plan  CHIEF COMPLAINT    No chief complaint on file  HISTORY    History of Present Illness     60y o  year old female     With past medical history of cervical radiculopathy, chronic back pain, headaches, mild protein calorie malnutrition, who presents for annual surveillance of a known 3 millimeter acoustic schwannoma as well as abnormal pituitary  She was initially referred to our practice in 2019 by her PCP  She was noted with abnormal pituitary on her MRI as well as the acoustic schwannoma  She has MRA imaging completed as far back as 2008 in which the acoustic schwannoma can be seen  Currently she states she is continuing to suffer from daily headaches  She describes them as pulsating at her right temple  She was supposed to undergo a workup for temporal arteritis with neurology and reports that she plans to follow up with neurology  She denies any associated photophobia or nausea or vomiting  She denies any associated visual disturbance  She states she was evaluated by an audiologist last year and was told her hearing is perfect  She denies any facial weakness or numbness  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  REVIEW OF SYSTEMS    Review of Systems   Constitutional: Negative  Negative for chills, fatigue and fever  HENT: Negative for hearing loss, postnasal drip, sore throat and tinnitus  Ears feel clogged 2/2 seasonal allergies   Eyes: Positive for visual disturbance (R>L eye blurred  )  Negative for pain (pain behind the eyes with HA)  Respiratory: Negative    Negative for chest tightness and shortness of breath  Cardiovascular: Negative  Negative for chest pain and palpitations  Gastrointestinal: Positive for constipation (chronic)  Negative for nausea  Endocrine: Negative  Negative for cold intolerance  Genitourinary: Negative  Musculoskeletal: Positive for arthralgias (hands), back pain, joint swelling, neck pain and neck stiffness  Negative for myalgias  Skin:        C/o Lump on right head, burning, increasing in siz  Right temple, pulsates / thumping   Allergic/Immunologic: Positive for environmental allergies and food allergies  Neurological: Positive for headaches (right side, almost daily getting worse coming from neck pulsating sensation)  Negative for dizziness, weakness, light-headedness and numbness  Hematological: Negative  Psychiatric/Behavioral: Negative  Negative for confusion, decreased concentration, dysphoric mood and sleep disturbance  The patient is not nervous/anxious            Meds/Allergies     Current Outpatient Medications   Medication Sig Dispense Refill   • Cholecalciferol (Vitamin D3) 1 25 MG (25832 UT) TABS Take 1 tablet (50,000 Units total) by mouth once a week 13 tablet 1   • ergocalciferol (ERGOCALCIFEROL) 1 25 MG (64170 UT) capsule Vitamin D2 1,250 mcg (50,000 unit) capsule     • Multiple Vitamin (MULTIVITAMIN) tablet Take 1 tablet by mouth daily     • oxyCODONE (ROXICODONE) 5 immediate release tablet Take 5 mg by mouth 2 (two) times a day as needed     • traMADol (ULTRAM) 50 mg tablet tramadol 50 mg tablet   TAKE ONE TABLET BY MOUTH ONCE EVERY 6 HOURS AS NEEDED     • hydrOXYzine pamoate (VISTARIL) 25 mg capsule take 1 to 2 capsules by mouth at bedtime if needed for anxiety EPISODES     • ibuprofen (MOTRIN) 200 mg tablet Take 400 mg by mouth daily (Patient not taking: Reported on 11/21/2022)     • omeprazole (PriLOSEC) 20 mg delayed release capsule Daily     • pregabalin (LYRICA) 150 mg capsule take 1 capsule by mouth twice a day for NERVE PAIN, DISCONTINUE 100 MG       No current facility-administered medications for this visit  Allergies   Allergen Reactions   • Other Anaphylaxis     Hazelnuts   • Psyllium Itching   • Sulfa Antibiotics        PAST HISTORY    Past Medical History:   Diagnosis Date   • Acoustic neuroma (HCC)    • Anxiety    • Chronic pain disorder     neck and back pain   • Pituitary adenoma (HCC)     monitored   • Tension headache     vs migraine       Past Surgical History:   Procedure Laterality Date   • CHEILECTOMY Right 2019    Procedure: CHEILECTOMY;  Surgeon: Lul Mc DPM;  Location: [facility];  Service: Podiatry       Social History     Tobacco Use   • Smoking status: Former     Types: Cigarettes     Quit date: 2014     Years since quittin 8   • Smokeless tobacco: Never   • Tobacco comments:     on and off over 40 years , less than a pck per day   Vaping Use   • Vaping Use: Never used   Substance Use Topics   • Alcohol use: No   • Drug use: No       Family History   Problem Relation Age of Onset   • Diabetes Family    • Leukemia Family          Above history personally reviewed  EXAM    Vitals:Blood pressure 122/72, pulse 76, temperature (!) 97 4 °F (36 3 °C), resp  rate 19, height 5' 5" (1 651 m), weight 46 5 kg (102 lb 9 6 oz), SpO2 96 %  ,Body mass index is 17 07 kg/m²  Physical Exam  Constitutional:       General: She is not in acute distress  Appearance: She is well-developed and well-nourished  HENT:      Head: Normocephalic and atraumatic  Ears:      Comments: Hearing intact bilaterally  Eyes:      Extraocular Movements: EOM normal       Pupils: Pupils are equal, round, and reactive to light  Neck:      Trachea: No tracheal deviation  Cardiovascular:      Rate and Rhythm: Normal rate  Pulmonary:      Effort: Pulmonary effort is normal    Abdominal:      Palpations: Abdomen is soft  Tenderness: There is no abdominal tenderness  There is no guarding  Musculoskeletal:         General: No edema  Cervical back: Neck supple  Skin:     General: Skin is warm and dry  Coloration: Skin is not pale  Findings: No rash  Neurological:      Mental Status: She is alert and oriented to person, place, and time  Comments: GCS 15, Awake, Alert, Oriented x 3    Motor: CARLOS, strength 5/5 throughout    Sensation:  intact to LT X 4     Reflexes: 2+ and symmetric, no avitia's or clonus     Coordination: no drift bilateral upper extremities, finger to nose normal bilaterally  Psychiatric:         Mood and Affect: Mood and affect normal          Behavior: Behavior normal          Neurologic Exam     Mental Status   Oriented to person, place, and time  Cranial Nerves     CN III, IV, VI   Pupils are equal, round, and reactive to light  Extraocular motions are normal          MEDICAL DECISION MAKING    Imaging Studies:     MRI brain IAC wo and w contrast    Result Date: 11/17/2022  Narrative: MRI BRAIN AND IAC'S -  WITH AND WITHOUT CONTRAST INDICATION: D33 3: Benign neoplasm of cranial nerves  COMPARISON:  None  TECHNIQUE: Multiplanar, multisequence imaging of the brain and IAC's was performed  Targeted images of the IAC'S were performed requiring additional time at acquisition and interpretation of approximately 25% IV Contrast:  5 mL of Gadobutrol injection (SINGLE-DOSE) IMAGE QUALITY:   Diagnostic  FINDINGS: BRAIN PARENCHYMA:  There is no discrete mass, mass effect or midline shift  Brainstem and cerebellum demonstrate normal signal  There is no intracranial hemorrhage  There is no evidence of acute infarction and diffusion imaging is unremarkable  There are no white matter changes in the cerebral hemispheres  Normal postcontrast imaging of the cerebral hemispheres, brainstem and cerebellar hemispheres   IAC'S:  Once again identified is a small nodular focus of enhancement within the midportion of the right internal auditory canal, see series 13 image 34 and series 11 image 6  This measures approximately 2 mm in size and likely represents a small schwannoma  VENTRICLES:  Normal  SELLA AND PITUITARY GLAND:  Normal  ORBITS:  Normal  PARANASAL SINUSES:  Normal  VASCULATURE:  Evaluation of the major intracranial vasculature demonstrates appropriate flow voids  CALVARIUM AND SKULL BASE:  Normal  EXTRACRANIAL SOFT TISSUES:  Normal      Impression: Stable MRI of the brain  Small 2 mm focus of enhancement within the mid right internal auditory canal consistent with schwannoma  Workstation performed: [id number]       I have personally reviewed pertinent reports     and I have personally reviewed pertinent films in PACS

## 2022-12-21 ENCOUNTER — HOSPITAL ENCOUNTER (OUTPATIENT)
Dept: MRI IMAGING | Facility: HOSPITAL | Age: 62
Discharge: HOME | End: 2022-12-21

## 2022-12-21 DIAGNOSIS — M54.16 RADICULOPATHY, LUMBAR REGION: ICD-10-CM

## 2023-07-28 ENCOUNTER — OFFICE VISIT (OUTPATIENT)
Dept: FAMILY MEDICINE CLINIC | Facility: CLINIC | Age: 63
End: 2023-07-28
Payer: COMMERCIAL

## 2023-07-28 VITALS
HEIGHT: 65 IN | TEMPERATURE: 98.7 F | OXYGEN SATURATION: 99 % | HEART RATE: 84 BPM | WEIGHT: 101.3 LBS | DIASTOLIC BLOOD PRESSURE: 60 MMHG | SYSTOLIC BLOOD PRESSURE: 102 MMHG | BODY MASS INDEX: 16.88 KG/M2

## 2023-07-28 DIAGNOSIS — R51.9 FACIAL PAIN: ICD-10-CM

## 2023-07-28 DIAGNOSIS — F11.20 CONTINUOUS OPIOID DEPENDENCE (HCC): ICD-10-CM

## 2023-07-28 DIAGNOSIS — S09.90XA TRAUMATIC INJURY OF HEAD, INITIAL ENCOUNTER: Primary | ICD-10-CM

## 2023-07-28 DIAGNOSIS — H53.8 BLURRED VISION: ICD-10-CM

## 2023-07-28 DIAGNOSIS — H53.9 VISUAL DISTURBANCE: ICD-10-CM

## 2023-07-28 DIAGNOSIS — D33.3 ACOUSTIC NEUROMA (HCC): ICD-10-CM

## 2023-07-28 PROCEDURE — 99214 OFFICE O/P EST MOD 30 MIN: CPT | Performed by: FAMILY MEDICINE

## 2023-07-28 NOTE — PROGRESS NOTES
Assessment/Plan: Patient will continue with ice and Motrin as directed. Patient go for CAT scans of the face and brain. Patient will have referral to ophthalmology. Follow-up in 1 month       Diagnoses and all orders for this visit:    Traumatic injury of head, initial encounter  -     CT facial bones wo contrast; Future  -     CT head wo contrast; Future    Blurred vision  -     Ambulatory Referral to Ophthalmology; Future    Facial pain  -     CT facial bones wo contrast; Future    Visual disturbance  -     CT head wo contrast; Future    Acoustic neuroma (HCC)    Continuous opioid dependence (HCC)            Subjective:        Patient ID: Amparo Fonseca is a 60 y.o. female. Patient is here status post blunt force trauma to the forehead from her dog roughly 3 weeks ago. Patient with near loss of consciousness. Patient with ecchymosis and bilaterally left greater than right. Patient also with swelling left face and ongoing facial pain nasal bridge and forehead. Patient with headaches and dizziness. Mood is not right. Patient also with pain over her zygomatic arch on the left. Patient also with floaters and visual disturbance along with photophobia. The following portions of the patient's history were reviewed and updated as appropriate: allergies, current medications, past family history, past medical history, past social history, past surgical history and problem list.      Review of Systems   Constitutional: Negative. HENT:        Facial pain, nasal bridge pain, orbital swelling   Eyes: Positive for photophobia and visual disturbance. Respiratory: Negative. Cardiovascular: Negative. Gastrointestinal: Negative. Endocrine: Negative. Genitourinary: Negative. Musculoskeletal: Negative. Skin: Negative. Allergic/Immunologic: Negative. Neurological: Positive for dizziness and headaches. Hematological: Bruises/bleeds easily. Psychiatric/Behavioral: Negative. Objective:      BMI Counseling: Body mass index is 16.86 kg/m². The BMI is below normal. Patient advised to gain weight. Rationale for BMI follow-up plan is due to patient being underweight. BMI Counseling: Body mass index is 16.86 kg/m². Follow-up plan was not completed due to patient refusing BMI follow-up plan. /60 (BP Location: Right arm, Patient Position: Sitting, Cuff Size: Standard)   Pulse 84   Temp 98.7 °F (37.1 °C) (Temporal)   Ht 5' 5" (1.651 m)   Wt 45.9 kg (101 lb 4.8 oz)   SpO2 99%   BMI 16.86 kg/m²          Physical Exam  Vitals and nursing note reviewed. Constitutional:       General: She is not in acute distress. Appearance: Normal appearance. She is not ill-appearing, toxic-appearing or diaphoretic. HENT:      Head:      Comments: Swelling over the supraorbital region on the left greater than the right. Patient also with nasal swelling over nasal bridge. Ecchymosis under eyes left greater than right. Right Ear: Tympanic membrane, ear canal and external ear normal. There is no impacted cerumen. Left Ear: Tympanic membrane, ear canal and external ear normal. There is no impacted cerumen. Nose: Nose normal. No congestion or rhinorrhea. Mouth/Throat:      Mouth: Mucous membranes are moist.      Pharynx: No oropharyngeal exudate or posterior oropharyngeal erythema. Eyes:      General: No scleral icterus. Right eye: No discharge. Left eye: No discharge. Neck:      Vascular: No carotid bruit. Cardiovascular:      Rate and Rhythm: Normal rate and regular rhythm. Pulses: Normal pulses. Heart sounds: Normal heart sounds. No murmur heard. No friction rub. No gallop. Pulmonary:      Effort: Pulmonary effort is normal. No respiratory distress. Breath sounds: Normal breath sounds. No stridor. No wheezing, rhonchi or rales. Chest:      Chest wall: No tenderness.    Musculoskeletal:         General: Tenderness present. No swelling, deformity or signs of injury. Cervical back: Neck supple. Tenderness present. No rigidity. No muscular tenderness. Right lower leg: No edema. Left lower leg: No edema. Comments: Tenderness with palpation around the orbit on the left. Patient with ecchymosis under left eye and right eye. Swelling over nasal bones bilaterally. Lymphadenopathy:      Cervical: No cervical adenopathy. Skin:     General: Skin is warm and dry. Capillary Refill: Capillary refill takes less than 2 seconds. Coloration: Skin is not jaundiced. Findings: Bruising present. No erythema, lesion or rash. Neurological:      Mental Status: She is alert and oriented to person, place, and time. Cranial Nerves: No cranial nerve deficit. Sensory: No sensory deficit. Motor: No weakness. Coordination: Coordination normal.      Gait: Gait normal.   Psychiatric:         Mood and Affect: Mood normal.         Behavior: Behavior normal.         Thought Content:  Thought content normal.         Judgment: Judgment normal.

## 2023-08-25 ENCOUNTER — RA CDI HCC (OUTPATIENT)
Dept: OTHER | Facility: HOSPITAL | Age: 63
End: 2023-08-25

## 2023-08-31 ENCOUNTER — HOSPITAL ENCOUNTER (OUTPATIENT)
Dept: CT IMAGING | Facility: HOSPITAL | Age: 63
End: 2023-08-31
Payer: COMMERCIAL

## 2023-08-31 DIAGNOSIS — R51.9 FACIAL PAIN: ICD-10-CM

## 2023-08-31 DIAGNOSIS — H53.9 VISUAL DISTURBANCE: ICD-10-CM

## 2023-08-31 DIAGNOSIS — S09.90XA TRAUMATIC INJURY OF HEAD, INITIAL ENCOUNTER: ICD-10-CM

## 2023-08-31 PROCEDURE — G1004 CDSM NDSC: HCPCS

## 2023-08-31 PROCEDURE — 70450 CT HEAD/BRAIN W/O DYE: CPT

## 2023-08-31 PROCEDURE — 70486 CT MAXILLOFACIAL W/O DYE: CPT

## 2023-10-20 ENCOUNTER — TELEPHONE (OUTPATIENT)
Dept: FAMILY MEDICINE CLINIC | Facility: CLINIC | Age: 63
End: 2023-10-20

## 2023-10-20 ENCOUNTER — OFFICE VISIT (OUTPATIENT)
Dept: FAMILY MEDICINE CLINIC | Facility: CLINIC | Age: 63
End: 2023-10-20

## 2023-10-20 VITALS
TEMPERATURE: 98.2 F | SYSTOLIC BLOOD PRESSURE: 98 MMHG | DIASTOLIC BLOOD PRESSURE: 60 MMHG | HEIGHT: 65 IN | WEIGHT: 102 LBS | BODY MASS INDEX: 17 KG/M2

## 2023-10-20 DIAGNOSIS — M54.40 CHRONIC MIDLINE LOW BACK PAIN WITH SCIATICA, SCIATICA LATERALITY UNSPECIFIED: ICD-10-CM

## 2023-10-20 DIAGNOSIS — I21.29 SEPTAL INFARCTION (HCC): ICD-10-CM

## 2023-10-20 DIAGNOSIS — M79.675 TOE PAIN, CHRONIC, LEFT: ICD-10-CM

## 2023-10-20 DIAGNOSIS — F11.20 CONTINUOUS OPIOID DEPENDENCE (HCC): ICD-10-CM

## 2023-10-20 DIAGNOSIS — Z01.818 PREOP EXAMINATION: Primary | ICD-10-CM

## 2023-10-20 DIAGNOSIS — I21.29 SEPTAL INFARCTION (HCC): Primary | ICD-10-CM

## 2023-10-20 DIAGNOSIS — G89.29 TOE PAIN, CHRONIC, LEFT: ICD-10-CM

## 2023-10-20 DIAGNOSIS — G89.29 CHRONIC MIDLINE LOW BACK PAIN WITH SCIATICA, SCIATICA LATERALITY UNSPECIFIED: ICD-10-CM

## 2023-10-20 NOTE — TELEPHONE ENCOUNTER
Pt seen today 10/20/2023  Staff made appt for Pt, as per Dr Joon love, with cardiologist, Dr Michael Riojas, for 12/12/2023 at 8:05am. Located at [address] [place] [address]. # [phone]. This was the first available appointment and she was also put on the waiting list.    Pt wants to know if it is possible for the PCP to put the cardiology referral as a stat request, to have an earlier appointment or if she should go to the ER to get checked, so she can get her results quicker. Please advise.

## 2023-10-20 NOTE — PROGRESS NOTES
Assessment/Plan: Patient will go for laboratory studies. Patient had EKG done at this time. Normal sinus rhythm with possible age-indeterminate septal infarct. Patient will be referred to cardiology for cardiac clearance. patient will refrain from any NSAIDs for 1 week prior to surgery. Patient will stop all supplements 1 week prior to surgery. Other guidance given at this time. Follow-up as needed. Diagnoses and all orders for this visit:    Preop examination  -     CBC and differential; Future  -     Comprehensive metabolic panel; Future  -     Lipid panel; Future  -     TSH, 3rd generation with Free T4 reflex; Future  -     Protime-INR; Future  -     POCT ECG    Toe pain, chronic, left    Chronic midline low back pain with sciatica, sciatica laterality unspecified    Continuous opioid dependence ([address])    Septal infarction [facility])  -     Ambulatory Referral to Cardiology; Future            Subjective:        Patient ID: Marciano Irizarry is a 58 y.o. female. Patient is here for left foot surgery. Patient is going to have fusion of the first MTP joint by Dr. Villavicencio on November 14. Patient in relatively normal state of health. No new chest pain shortness of breath palpitations syncope or dizziness. No abdominal pain or problems urinating or defecating. No fevers or chills or URI symptoms or other cough. Patient does have some left foot/toe pain related to this. Patient developing bunion. Patient did have laboratory studies at [facility] done in July. Patient states laboratory studies were normal.  No problems walking up a flight of stairs from a cardiovascular standpoint. The following portions of the patient's history were reviewed and updated as appropriate: allergies, current medications, past family history, past medical history, past social history, past surgical history and problem list.      Review of Systems   Constitutional: Negative. HENT: Negative. Eyes: Negative. Respiratory: Negative. Cardiovascular: Negative. Gastrointestinal: Negative. Endocrine: Negative. Genitourinary: Negative. Musculoskeletal:  Positive for arthralgias. Skin: Negative. Allergic/Immunologic: Negative. Neurological: Negative. Hematological: Negative. Psychiatric/Behavioral: Negative. Objective:      BMI Counseling: Body mass index is 16.97 kg/m². The BMI is above normal. Nutrition recommendations include encouraging healthy choices of fruits and vegetables. Exercise recommendations include moderate physical activity 150 minutes/week. Rationale for BMI follow-up plan is due to patient being overweight or obese. BMI Counseling: Body mass index is 16.97 kg/m². The BMI is below normal. Patient advised to gain weight. Rationale for BMI follow-up plan is due to patient being underweight. Depression Screening and Follow-up Plan: Clincally patient does not have depression. No treatment is required. BP 98/60 (BP Location: Right arm, Patient Position: Sitting, Cuff Size: Standard)   Temp 98.2 °F (36.8 °C) (Temporal)   Ht 5' 5" (1.651 m)   Wt 46.3 kg (102 lb)   BMI 16.97 kg/m²          Physical Exam  Vitals and nursing note reviewed. Constitutional:       General: She is not in acute distress. Appearance: Normal appearance. She is not ill-appearing, toxic-appearing or diaphoretic. HENT:      Head: Normocephalic and atraumatic. Right Ear: Tympanic membrane, ear canal and external ear normal. There is no impacted cerumen. Left Ear: Tympanic membrane, ear canal and external ear normal. There is no impacted cerumen. Nose: Nose normal. No congestion or rhinorrhea. Mouth/Throat:      Mouth: Mucous membranes are moist.      Pharynx: No oropharyngeal exudate or posterior oropharyngeal erythema. Eyes:      General: No scleral icterus. Right eye: No discharge. Left eye: No discharge.    Neck:      Vascular: No carotid bruit. Cardiovascular:      Rate and Rhythm: Normal rate and regular rhythm. Pulses: Normal pulses. Heart sounds: Normal heart sounds. No murmur heard. No friction rub. No gallop. Pulmonary:      Effort: Pulmonary effort is normal. No respiratory distress. Breath sounds: Normal breath sounds. No stridor. No wheezing, rhonchi or rales. Chest:      Chest wall: No tenderness. Musculoskeletal:         General: Tenderness present. No swelling, deformity or signs of injury. Cervical back: Normal range of motion and neck supple. No rigidity. No muscular tenderness. Right lower leg: No edema. Left lower leg: No edema. Lymphadenopathy:      Cervical: No cervical adenopathy. Skin:     General: Skin is warm and dry. Capillary Refill: Capillary refill takes less than 2 seconds. Coloration: Skin is not jaundiced. Findings: No bruising, erythema, lesion or rash. Neurological:      Mental Status: She is alert and oriented to person, place, and time. Mental status is at baseline. Cranial Nerves: No cranial nerve deficit. Sensory: No sensory deficit. Motor: No weakness. Coordination: Coordination normal.      Gait: Gait normal.   Psychiatric:         Mood and Affect: Mood normal.         Behavior: Behavior normal.         Thought Content:  Thought content normal.         Judgment: Judgment normal.

## 2023-10-24 ENCOUNTER — TELEPHONE (OUTPATIENT)
Dept: FAMILY MEDICINE CLINIC | Facility: CLINIC | Age: 63
End: 2023-10-24

## 2023-10-24 NOTE — TELEPHONE ENCOUNTER
[facility]   Pt is aware

## 2023-10-25 ENCOUNTER — PREP FOR PROCEDURE (OUTPATIENT)
Dept: OBGYN CLINIC | Facility: OTHER | Age: 63
End: 2023-10-25

## 2023-10-25 DIAGNOSIS — M20.5X2 ACQUIRED HALLUX LIMITUS OF LEFT FOOT: Primary | ICD-10-CM

## 2023-10-30 ENCOUNTER — CONSULT (OUTPATIENT)
Dept: CARDIOLOGY CLINIC | Facility: CLINIC | Age: 63
End: 2023-10-30
Payer: COMMERCIAL

## 2023-10-30 VITALS
DIASTOLIC BLOOD PRESSURE: 66 MMHG | HEART RATE: 75 BPM | HEIGHT: 65 IN | BODY MASS INDEX: 17.14 KG/M2 | WEIGHT: 102.9 LBS | SYSTOLIC BLOOD PRESSURE: 110 MMHG | OXYGEN SATURATION: 99 %

## 2023-10-30 DIAGNOSIS — R07.89 OTHER CHEST PAIN: Primary | ICD-10-CM

## 2023-10-30 DIAGNOSIS — I21.29 SEPTAL INFARCTION (HCC): ICD-10-CM

## 2023-10-30 DIAGNOSIS — R94.31 ABNORMAL ECG: ICD-10-CM

## 2023-10-30 PROCEDURE — 99204 OFFICE O/P NEW MOD 45 MIN: CPT | Performed by: INTERNAL MEDICINE

## 2023-10-30 PROCEDURE — 93000 ELECTROCARDIOGRAM COMPLETE: CPT | Performed by: INTERNAL MEDICINE

## 2023-10-30 NOTE — PROGRESS NOTES
Favian Reid  1960  [id number]  [facility]  [address] [address] [phone]    1. Other chest pain  POCT ECG    Echo complete w/ contrast if indicated    Echo stress test, exercise      2. Septal infarction [facility])  Ambulatory Referral to Cardiology    Ambulatory Referral to Cardiology    POCT ECG    Echo complete w/ contrast if indicated    Echo stress test, exercise      3. Abnormal ECG  POCT ECG    Echo complete w/ contrast if indicated    Echo stress test, exercise          Discussion/Summary: Patient has had chest pain with typical and atypical features she has abnormal resting EKG with left anterior fascicular block and poor R wave progression. Recommend a stress echo. We will also check a regular echocardiogram to rule out any underlying structural heart disease due to presence of dyspnea as well. Blood pressure is well controlled lipids are due to be checked. As long as the above-mentioned tests are within normal limits she is at acceptable risk for surgery on her foot. Interval History: Very pleasant 67-year-old female presents in consultation on behalf of Dr. Stefan Smith for chest pain and abnormal EKG. Overall she is very physically active she does a lot around her house she was just working outside repairing a chicken coop doing some heavy manual labor and felt well. She occasionally gets episodes of chest tightness which she describes as centrally located also sometimes involving the left shoulder. These can occur with and without exertion. Denies any significant shortness of breath unless she pushes herself quite hard. Denies any palpitations. There is been no lower extremity edema, PND, orthopnea. She is a non-smoker minimal alcohol. Poor hydration.     Medical Problems       Problem List       Lower back pain    Neck pain    Slow transit constipation    Right upper quadrant abdominal pain    Visual disturbance    Tension type headache    Dizziness    Memory loss    Anxiety    Scalp mass    Acoustic neuroma (HCC)    Preop examination    Arthritis of big toe    Abnormal brain MRI    Cervical radiculopathy    Right ear pain    Chronic otitis media with serous effusion    Arthritis    Hallux rigidus of right foot    Numbness of foot    Other bursitis, not elsewhere classified, right ankle and foot    Abrasion of left cornea    Mild protein-calorie malnutrition ([address])    Malnutrition Findings:                                 BMI Findings: Body mass index is 17.12 kg/m².          Intractable migraine without aura and without status migrainosus    Continuous opioid dependence (HCC)    Head trauma    Blurred vision    Facial pain    Toe pain, chronic, left    Abnormal ECG    Other chest pain        Past Medical History:   Diagnosis Date    Acoustic neuroma (HCC)     Anxiety     Chronic pain disorder     neck and back pain    Pituitary adenoma ([facility])     monitored    Tension headache     vs migraine     Social History     Socioeconomic History    Marital status:      Spouse name: Not on file    Number of children: 3    Years of education: Masters degree    Highest education level: Not on file   Occupational History     Comment: Psych NP   Tobacco Use    Smoking status: Former     Types: Cigarettes     Quit date: 2014     Years since quittin.7    Smokeless tobacco: Never    Tobacco comments:     on and off over 40 years , less than a pck per day   Vaping Use    Vaping Use: Never used   Substance and Sexual Activity    Alcohol use: No    Drug use: No    Sexual activity: Not Currently   Other Topics Concern    Not on file   Social History Narrative    Lives at home with Mother and 1 adult child     Social Determinants of Health     Financial Resource Strain: Not on file   Food Insecurity: Not on file   Transportation Needs: Not on file   Physical Activity: Not on file   Stress: Not on file   Social Connections: Not on file   Intimate Partner Violence: Not on file   Housing Stability: Not on file      Family History   Problem Relation Age of Onset    Diabetes Family     Leukemia Family      Past Surgical History:   Procedure Laterality Date    CHEILECTOMY Right 11/12/2019    Procedure: CHEILECTOMY;  Surgeon: Kelsi Woodward DPM;  Location: [facility] OR;  Service: Podiatry       Current Outpatient Medications:     Cholecalciferol (Vitamin D3) 1.25 MG (35495 UT) TABS, Take 1 tablet (50,000 Units total) by mouth once a week, Disp: 13 tablet, Rfl: 1    Multiple Vitamin (MULTIVITAMIN) tablet, Take 1 tablet by mouth daily, Disp: , Rfl:     omeprazole (PriLOSEC) 20 mg delayed release capsule, Daily, Disp: , Rfl:     oxyCODONE (ROXICODONE) 5 immediate release tablet, Take 5 mg by mouth 2 (two) times a day as needed, Disp: , Rfl:     pregabalin (LYRICA) 150 mg capsule, take 1 capsule by mouth twice a day for NERVE PAIN, DISCONTINUE 100 MG, Disp: , Rfl:     traMADol (ULTRAM) 50 mg tablet, tramadol 50 mg tablet  TAKE ONE TABLET BY MOUTH ONCE EVERY 6 HOURS AS NEEDED, Disp: , Rfl:     ergocalciferol (ERGOCALCIFEROL) 1.25 MG (15648 UT) capsule, Vitamin D2 1,250 mcg (50,000 unit) capsule, Disp: , Rfl:   Allergies   Allergen Reactions    Other Anaphylaxis     Hazelnuts    Psyllium Itching    Latex      Added based on information entered during case entry, please review and add reactions, type, and severity as needed    Sulfa Antibiotics        Labs:     Chemistry        Component Value Date/Time    K 4.1 05/18/2022 1404     05/18/2022 1404    CO2 28 05/18/2022 1404    BUN 17 05/18/2022 1404    CREATININE 0.73 05/18/2022 1404        Component Value Date/Time    CALCIUM 9.4 05/18/2022 1404    ALKPHOS 94 05/18/2022 1404    AST 38 (H) 05/18/2022 1404    ALT 40 (H) 05/18/2022 1404            No results found for: "CHOL"  Lab Results   Component Value Date    HDL 83 03/05/2019     Lab Results   Component Value Date    LDLCALC 100 (H) 03/05/2019     Lab Results   Component Value Date    TRIG 52 03/05/2019     No results found for: "CHOLHDL"    Imaging: No results found. ECG:  nsr lafb      Review of Systems   Constitutional: Negative. HENT: Negative. Eyes: Negative. Cardiovascular:  Positive for chest pain. Respiratory: Negative. Endocrine: Negative. Hematologic/Lymphatic: Negative. Skin: Negative. Musculoskeletal: Negative. Gastrointestinal: Negative. Genitourinary: Negative. Neurological: Negative. Psychiatric/Behavioral: Negative. All other systems reviewed and are negative. Vitals:    10/30/23 1119   BP: 110/66   Pulse: 75   SpO2: 99%     Vitals:    10/30/23 1119   Weight: 46.7 kg (102 lb 14.4 oz)     Height: 5' 5" (165.1 cm)   Body mass index is 17.12 kg/m². Physical Exam:  Vital signs reviewed. General appearance:  Appears stated age, alert, well appearing and in no distress  HEENT:  PERRLA, EOMI, no scleral icterus, no conjunctival pallor  NECK:  Supple, No elevated JVP, no thyromegaly, no carotid bruits, no JVD  HEART:  Regular rate and rhythm, normal S1/S2, no S3/S4, no murmur or rub, PMI nondisplaced  LUNGS:  Clear to auscultation bilaterally, no wheezes rales or rhonchi  ABDOMEN:  Soft, non-tender, positive bowel sounds, no rebound or guarding, no organomegaly   EXTREMITIES:  Normal range of motion. No clubbing or cyanosis.  No edema  VASCULAR:  Normal pedal pulses   SKIN: No lesions or rashes on exposed skin  NEURO:  CN II-XII intact, no focal deficits

## 2023-11-01 LAB
ALBUMIN SERPL-MCNC: 4.4 G/DL (ref 3.6–5.1)
ALBUMIN/GLOB SERPL: 1.4 (CALC) (ref 1–2.5)
ALP SERPL-CCNC: 86 U/L (ref 37–153)
ALT SERPL-CCNC: 62 U/L (ref 6–29)
AST SERPL-CCNC: 57 U/L (ref 10–35)
BASOPHILS # BLD AUTO: 32 CELLS/UL (ref 0–200)
BASOPHILS NFR BLD AUTO: 0.5 %
BILIRUB SERPL-MCNC: 0.8 MG/DL (ref 0.2–1.2)
BUN SERPL-MCNC: 16 MG/DL (ref 7–25)
BUN/CREAT SERPL: ABNORMAL (CALC) (ref 6–22)
CALCIUM SERPL-MCNC: 9.3 MG/DL (ref 8.6–10.4)
CHLORIDE SERPL-SCNC: 102 MMOL/L (ref 98–110)
CHOLEST SERPL-MCNC: 173 MG/DL
CHOLEST/HDLC SERPL: 2.2 (CALC)
CO2 SERPL-SCNC: 30 MMOL/L (ref 20–32)
CREAT SERPL-MCNC: 0.74 MG/DL (ref 0.5–1.05)
EOSINOPHIL # BLD AUTO: 128 CELLS/UL (ref 15–500)
EOSINOPHIL NFR BLD AUTO: 2 %
ERYTHROCYTE [DISTWIDTH] IN BLOOD BY AUTOMATED COUNT: 11.3 % (ref 11–15)
GFR/BSA.PRED SERPLBLD CYS-BASED-ARV: 90 ML/MIN/1.73M2
GLOBULIN SER CALC-MCNC: 3.1 G/DL (CALC) (ref 1.9–3.7)
GLUCOSE SERPL-MCNC: 88 MG/DL (ref 65–99)
HCT VFR BLD AUTO: 42.9 % (ref 35–45)
HDLC SERPL-MCNC: 80 MG/DL
HGB BLD-MCNC: 14.6 G/DL (ref 11.7–15.5)
INR PPP: 1
LDLC SERPL CALC-MCNC: 80 MG/DL (CALC)
LYMPHOCYTES # BLD AUTO: 2899 CELLS/UL (ref 850–3900)
LYMPHOCYTES NFR BLD AUTO: 45.3 %
MCH RBC QN AUTO: 32.2 PG (ref 27–33)
MCHC RBC AUTO-ENTMCNC: 34 G/DL (ref 32–36)
MCV RBC AUTO: 94.5 FL (ref 80–100)
MONOCYTES # BLD AUTO: 499 CELLS/UL (ref 200–950)
MONOCYTES NFR BLD AUTO: 7.8 %
NEUTROPHILS # BLD AUTO: 2842 CELLS/UL (ref 1500–7800)
NEUTROPHILS NFR BLD AUTO: 44.4 %
NONHDLC SERPL-MCNC: 93 MG/DL (CALC)
PLATELET # BLD AUTO: 285 THOUSAND/UL (ref 140–400)
PMV BLD REES-ECKER: 11.6 FL (ref 7.5–12.5)
POTASSIUM SERPL-SCNC: 4.1 MMOL/L (ref 3.5–5.3)
PROT SERPL-MCNC: 7.5 G/DL (ref 6.1–8.1)
PROTHROMBIN TIME: 10.9 SEC (ref 9–11.5)
RBC # BLD AUTO: 4.54 MILLION/UL (ref 3.8–5.1)
SODIUM SERPL-SCNC: 137 MMOL/L (ref 135–146)
TRIGL SERPL-MCNC: 44 MG/DL
TSH SERPL-ACNC: 1.18 MIU/L (ref 0.4–4.5)
WBC # BLD AUTO: 6.4 THOUSAND/UL (ref 3.8–10.8)

## 2023-11-02 ENCOUNTER — TELEPHONE (OUTPATIENT)
Dept: FAMILY MEDICINE CLINIC | Facility: CLINIC | Age: 63
End: 2023-11-02

## 2023-11-02 DIAGNOSIS — R79.89 ELEVATED LIVER FUNCTION TESTS: Primary | ICD-10-CM

## 2023-11-02 NOTE — TELEPHONE ENCOUNTER
Patient is wanting her referral to be put in as STAT for her gastro referral. Patient concerned about the increase on LFTs. Patient would like a call once referral is placed STAT.

## 2023-11-06 ENCOUNTER — HOSPITAL ENCOUNTER (OUTPATIENT)
Dept: ULTRASOUND IMAGING | Facility: HOSPITAL | Age: 63
Discharge: HOME | End: 2023-11-06
Attending: INTERNAL MEDICINE
Payer: COMMERCIAL

## 2023-11-06 ENCOUNTER — OFFICE VISIT (OUTPATIENT)
Dept: GASTROENTEROLOGY | Facility: CLINIC | Age: 63
End: 2023-11-06
Payer: COMMERCIAL

## 2023-11-06 VITALS
HEART RATE: 84 BPM | DIASTOLIC BLOOD PRESSURE: 61 MMHG | SYSTOLIC BLOOD PRESSURE: 97 MMHG | WEIGHT: 101 LBS | TEMPERATURE: 98 F | BODY MASS INDEX: 16.81 KG/M2

## 2023-11-06 DIAGNOSIS — R79.89 ELEVATED LIVER FUNCTION TESTS: ICD-10-CM

## 2023-11-06 PROCEDURE — 99213 OFFICE O/P EST LOW 20 MIN: CPT | Performed by: INTERNAL MEDICINE

## 2023-11-06 PROCEDURE — 76700 US EXAM ABDOM COMPLETE: CPT

## 2023-11-06 NOTE — PROGRESS NOTES
[facility] - Outpatient Consultation  Lazaro Rose 58 y.o. female MRN: [id number]  Encounter: [id number]    PCP:  Darci Orneals, [phone]  Referring Provider:  Shannan Pickard DO, [phone]        ASSESSMENT AND PLAN:    Mildly elevated transaminases to 1.5 times the upper limit of normal:    No clear etiology based on history, however there are several reports of black seed oil causing elevated transaminases. It is very likely, however, she has had low levels of elevated transaminases prior to starting it. Chronic Senna use has also been associated with causes of liver injury. Given that she has not had laboratory work-up to rule out the more common underlying causes of chronic liver disease, I have requested extensive serologic evaluation to rule out autoimmune and viral hepatitis, hemochromatosis and alpha-1 antitrypsin deficiency. I have also requested abdominal ultrasound to evaluate her liver and spleen and to rule out signs of fatty liver disease and cirrhosis/portal hypertension. She will stop taking black seed oil and have repeat LFTs. If LFTs remain elevated we can do a second round of blood work, hold her senna tea, using MiraLAX, and continue to follow LFTs. If LFTs still remain elevated, we can consider doing a biopsy to evaluate histologically. Colon cancer screening:  Colonoscopy done early in 2022 showed a redundant right colon and the removal of 3 polyps, none of which were adenomatous. Repeat colonoscopy in 9 years. FOLLOW-UP:  Return in about 3 months (around 2/6/2024). VISIT DIAGNOSES AND ORDERS:      1.  Elevated liver function tests      Orders Placed This Encounter   Procedures    US abdomen complete    CBC and differential    Protime-INR    Antimitochondrial antibody    Anti-smooth muscle antibody, IgG    Hepatitis B surface antibody    Hepatitis A antibody, total    MERRITT Screen w/ Reflex to Titer/Pattern    Celiac Disease Antibody Profile    Hepatitis panel, acute    TIBC Panel (incl. Iron, TIBC, % Iron Saturation)    IgG, IgA, IgM    Alpha-1-antitrypsin     ______________________________________________________________________    HPI:  Ms. Misty Pena is a 58 y.o. female with medical history as outlined below, including chronic idiopathic constipation, arthritis of the big toe, cervical radiculopathy, chronic opioid dependence who is being evaluated for to surgery and blood work showed elevated transaminases with an AST of 57 and ALT of 62. She has a history of mildly elevated transaminases going back several years, as far back as 2019 in our system with a slightly elevated ALT of 36. Thorough review of blood work done to date does not show any work-up of underlying chronic liver disease. She not had any abdominal imaging. She did have a colonoscopy in 2022. She leads a self-reported healthy lifestyle with organic, whole food diet. She does take black seed oil supplements for the last several years. She does not drink alcohol. She drinks senna tea every evening to help with her constipation. Ms. Rafael Alfred denies recent or history of yellow eyes/skin, dark urine, GI bleeding, abdominal distention She denies abdominal pain, nausea, vomiting, heartburn, reflux, difficulty swallowing, early satiety, bloating, diarrhea, or straining with passing stools. She does occasionally have LLQ discomfort prior to having a bowel movement. REVIEW OF SYSTEMS:    Review of Systems   All other systems reviewed and are negative.         Historical Information   Patient Active Problem List   Diagnosis    Lower back pain    Neck pain    Slow transit constipation    Right upper quadrant abdominal pain    Visual disturbance    Tension type headache    Dizziness    Memory loss    Anxiety    Scalp mass    Acoustic neuroma (HCC)    Preop examination    Arthritis of big toe    Abnormal brain MRI    Cervical radiculopathy    Right ear pain    Chronic otitis media with serous effusion    Arthritis    Hallux rigidus of right foot    Numbness of foot    Other bursitis, not elsewhere classified, right ankle and foot    Abrasion of left cornea    Mild protein-calorie malnutrition (HCC)    Intractable migraine without aura and without status migrainosus    Continuous opioid dependence (HCC)    Head trauma    Blurred vision    Facial pain    Toe pain, chronic, left    Abnormal ECG    Other chest pain     Past Medical History:   Diagnosis Date    Acoustic neuroma (HCC)     Anxiety     Chronic pain disorder     neck and back pain    Pituitary adenoma ([facility])     monitored    Tension headache     vs migraine     Past Surgical History:   Procedure Laterality Date    CHEILECTOMY Right 2019    Procedure: CHEILECTOMY;  Surgeon: Asha Albright DPM;  Location: [facility] OR;  Service: Podiatry     Social History   Social History     Substance and Sexual Activity   Alcohol Use No     Social History     Substance and Sexual Activity   Drug Use No     Social History     Tobacco Use   Smoking Status Former    Types: Cigarettes    Quit date: 2014    Years since quittin.8   Smokeless Tobacco Never   Tobacco Comments    on and off over 40 years , less than a pck per day     Family History   Problem Relation Age of Onset    Diabetes Family     Leukemia Family        Meds/Allergies       Current Outpatient Medications:     Cholecalciferol (Vitamin D3) 1.25 MG (70797 UT) TABS    Multiple Vitamin (MULTIVITAMIN) tablet    ergocalciferol (ERGOCALCIFEROL) 1.25 MG (79291 UT) capsule    omeprazole (PriLOSEC) 20 mg delayed release capsule    oxyCODONE (ROXICODONE) 5 immediate release tablet    pregabalin (LYRICA) 150 mg capsule    traMADol (ULTRAM) 50 mg tablet    Allergies   Allergen Reactions    Other Anaphylaxis     Hazelnuts    Psyllium Itching    Latex      Added based on information entered during case entry, please review and add reactions, type, and severity as needed    Sulfa Antibiotics            Objective     Blood pressure 97/61, pulse 84, temperature 98 °F (36.7 °C), temperature source Tympanic, weight 45.8 kg (101 lb). Body mass index is 16.81 kg/m². PHYSICAL EXAM:           Physical Exam  Vitals reviewed. Constitutional:       General: She is not in acute distress. Appearance: Normal appearance. She is not ill-appearing. Comments: thin   HENT:      Head: Normocephalic and atraumatic. Nose: Nose normal.      Mouth/Throat:      Mouth: Mucous membranes are moist.      Pharynx: Oropharynx is clear. Eyes:      General: No scleral icterus. Extraocular Movements: Extraocular movements intact. Cardiovascular:      Rate and Rhythm: Normal rate and regular rhythm. Heart sounds: No murmur heard. Pulmonary:      Effort: Pulmonary effort is normal. No respiratory distress. Breath sounds: Normal breath sounds. Abdominal:      General: Abdomen is flat. Palpations: Abdomen is soft. There is no shifting dullness, fluid wave, hepatomegaly or splenomegaly. Tenderness: There is no abdominal tenderness. Hernia: No hernia is present. Genitourinary:     Comments: deferred  Musculoskeletal:         General: No swelling. Normal range of motion. Cervical back: Normal range of motion and neck supple. No tenderness. Skin:     General: Skin is warm. Coloration: Skin is not jaundiced. Findings: No bruising or rash. Neurological:      General: No focal deficit present. Mental Status: She is alert and oriented to person, place, and time.    Psychiatric:         Mood and Affect: Mood normal.              Lab Results:   Lab Results   Component Value Date    SODIUM 137 11/01/2023    K 4.1 11/01/2023    BUN 16 11/01/2023    CREATININE 0.74 11/01/2023    MG 2.1 03/05/2019    TBILI 0.8 11/01/2023    ALKPHOS 86 11/01/2023    ALB 4.4 11/01/2023    AST 57 (H) 11/01/2023    ALT 62 (H) 11/01/2023    WBC 6.4 11/01/2023     11/01/2023 HGB 14.6 11/01/2023    INR 1.0 11/01/2023     No results found for: "AFP"  No results found for: "IRON", "FERRITIN", "CONCFE", "TRANSFERRIN", "MERRITT", "SMOOTHMUSCAB", "AMAIFA", "IGG", "IGM", "CERULOPLSM", "AATPHENOTYPE", "HAV", "HEPBSAG", "HEPBSAB", "HEPBCAB", "HEPCAB"  Lab Results   Component Value Date    HGBA1C 5.2 03/05/2019    TSH 0.87 10/19/2019     MELD 3.0: 7 at 11/1/2023  8:58 AM  MELD-Na: 6 at 11/1/2023  8:58 AM  Calculated from:  Serum Creatinine: 0.74 mg/dL (Using min of 1 mg/dL) at 11/1/2023  8:58 AM  Serum Sodium: 137 mmol/L at 11/1/2023  8:58 AM  Total Bilirubin: 0.8 mg/dL (Using min of 1 mg/dL) at 11/1/2023  8:58 AM  Serum Albumin: 4.4 g/dL (Using max of 3.5 g/dL) at 11/1/2023  8:58 AM  INR(ratio): 1.0 at 11/1/2023  8:58 AM  Age at listing (hypothetical): 58 years  Sex: Female at 11/1/2023  8:58 AM      Radiology Results:   I have reviewed the results of any radiology studies performed within the last 90 days. This includes:      No results found.       Heidy Boone MD  Hepatology/Gastroenterology

## 2023-11-06 NOTE — PATIENT INSTRUCTIONS
Please have the blood work done at your earliest convenience. Schedule an abdominal ultrasound. Stop taking the black seed oil.

## 2023-11-08 LAB
A1AT SERPL-MCNC: 129 MG/DL (ref 83–199)
ANA PAT SER IF-IMP: ABNORMAL
ANA PAT SER-IMP: ABNORMAL
ANA SER QL IF: POSITIVE
ANA TITR SER IF: ABNORMAL TITER
ANA TITR SER IF: ABNORMAL TITER
BASOPHILS # BLD AUTO: 29 CELLS/UL (ref 0–200)
BASOPHILS NFR BLD AUTO: 0.4 %
EOSINOPHIL # BLD AUTO: 94 CELLS/UL (ref 15–500)
EOSINOPHIL NFR BLD AUTO: 1.3 %
ERYTHROCYTE [DISTWIDTH] IN BLOOD BY AUTOMATED COUNT: 11.4 % (ref 11–15)
HAV AB SER QL IA: NORMAL
HAV IGM SERPL QL IA: ABNORMAL
HBV CORE IGM SERPL QL IA: ABNORMAL
HBV SURFACE AB SER QL IA: REACTIVE
HBV SURFACE AG SERPL QL IA: ABNORMAL
HCT VFR BLD AUTO: 42.6 % (ref 35–45)
HCV AB SERPL QL IA: REACTIVE
HCV RNA SERPL NAA+PROBE-ACNC: ABNORMAL IU/ML
HCV RNA SERPL NAA+PROBE-LOG IU: 5.8 LOG IU/ML
HGB BLD-MCNC: 14.6 G/DL (ref 11.7–15.5)
IGA SERPL-MCNC: 222 MG/DL (ref 70–320)
IGA SERPL-MCNC: 222 MG/DL (ref 70–320)
IGG SERPL-MCNC: 1564 MG/DL (ref 600–1540)
IGM SERPL-MCNC: 318 MG/DL (ref 50–300)
INR PPP: 1.1
IRON SATN MFR SERPL: 30 % (CALC) (ref 16–45)
IRON SERPL-MCNC: 112 MCG/DL (ref 45–160)
LYMPHOCYTES # BLD AUTO: 2765 CELLS/UL (ref 850–3900)
LYMPHOCYTES NFR BLD AUTO: 38.4 %
MCH RBC QN AUTO: 32.2 PG (ref 27–33)
MCHC RBC AUTO-ENTMCNC: 34.3 G/DL (ref 32–36)
MCV RBC AUTO: 94 FL (ref 80–100)
MITOCHONDRIA AB SER QL IF: NEGATIVE
MONOCYTES # BLD AUTO: 540 CELLS/UL (ref 200–950)
MONOCYTES NFR BLD AUTO: 7.5 %
NEUTROPHILS # BLD AUTO: 3773 CELLS/UL (ref 1500–7800)
NEUTROPHILS NFR BLD AUTO: 52.4 %
PLATELET # BLD AUTO: 289 THOUSAND/UL (ref 140–400)
PMV BLD REES-ECKER: 11.7 FL (ref 7.5–12.5)
PROTHROMBIN TIME: 11 SEC (ref 9–11.5)
RBC # BLD AUTO: 4.53 MILLION/UL (ref 3.8–5.1)
SMOOTH MUSCLE AB SER QL IF: NEGATIVE
TIBC SERPL-MCNC: 370 MCG/DL (CALC) (ref 250–450)
TTG IGA SER-ACNC: <1 U/ML
WBC # BLD AUTO: 7.2 THOUSAND/UL (ref 3.8–10.8)

## 2023-11-09 ENCOUNTER — TELEPHONE (OUTPATIENT)
Dept: GASTROENTEROLOGY | Facility: CLINIC | Age: 63
End: 2023-11-09

## 2023-11-09 ENCOUNTER — TELEPHONE (OUTPATIENT)
Age: 63
End: 2023-11-09

## 2023-11-09 DIAGNOSIS — B19.20 HEPATITIS C TEST POSITIVE: Primary | ICD-10-CM

## 2023-11-09 NOTE — TELEPHONE ENCOUNTER
Patients GI provider:  Dr. Kathy Jay    Number to return call: [phone]    Reason for call: Pt returning call to discuss, advised that it is a lot to take in via a message so she would like to speak w/someone. Transferred pt to [address] at [address] [place] [address] for further assistance.     Scheduled procedure/appointment date if applicable: [address]44/5259

## 2023-11-09 NOTE — TELEPHONE ENCOUNTER
Patients GI provider:  Dr. Josefina Castellano to return call: [phone]     Reason for call: Pt calling returning call regarding results. Call got disconnected. Attempted to contact patient back, line stated patient was not accepting calls at this time. Please contact patient to discuss recent lab results.

## 2023-11-09 NOTE — TELEPHONE ENCOUNTER
Scheduled pt on 11/10/23 with Dr. Lacey Varma @ [facility]

## 2023-11-09 NOTE — TELEPHONE ENCOUNTER
Spoke to patient wendy whitney answered her questions as best as I could help. Patient will discuss with Doctor in more details.

## 2023-11-09 NOTE — TELEPHONE ENCOUNTER
----- Message from Cydney Bartlett MD sent at 11/9/2023 10:59 AM EST -----  Ms. Leopoldo Piccolo serologic work-up for underlying liver disease was positive for active chronic hepatitis C infection. I have tried calling a few time without successfully reaching her and am unable to leave a voicemail. I will message her via [email or web address] to call our office to schedule an office visit to discuss chronic HCV treatment. Hep Staff:  Please try to call patient to schedule her to be seen at her earliest convenience with me, Candace or in fellow's clinic.     [place]in

## 2023-11-09 NOTE — TELEPHONE ENCOUNTER
----- Message from Xochilt Varela MD sent at 11/9/2023 10:59 AM EST -----  Ms. Villeda Sender serologic work-up for underlying liver disease was positive for active chronic hepatitis C infection. I have tried calling a few time without successfully reaching her and am unable to leave a voicemail. I will message her via [employer] to call our office to schedule an office visit to discuss chronic HCV treatment. Hep Staff:  Please try to call patient to schedule her to be seen at her earliest convenience with me, Candace or in fellow's clinic.     Griffin

## 2023-11-10 ENCOUNTER — OFFICE VISIT (OUTPATIENT)
Dept: GASTROENTEROLOGY | Facility: CLINIC | Age: 63
End: 2023-11-10
Payer: COMMERCIAL

## 2023-11-10 VITALS
DIASTOLIC BLOOD PRESSURE: 72 MMHG | TEMPERATURE: 98.2 F | WEIGHT: 100.4 LBS | BODY MASS INDEX: 16.73 KG/M2 | HEART RATE: 84 BPM | HEIGHT: 65 IN | SYSTOLIC BLOOD PRESSURE: 100 MMHG

## 2023-11-10 DIAGNOSIS — B18.2 CHRONIC HEPATITIS C WITHOUT HEPATIC COMA (HCC): ICD-10-CM

## 2023-11-10 DIAGNOSIS — R79.89 ELEVATED LIVER FUNCTION TESTS: Primary | ICD-10-CM

## 2023-11-10 PROCEDURE — 99213 OFFICE O/P EST LOW 20 MIN: CPT | Performed by: INTERNAL MEDICINE

## 2023-11-10 RX ORDER — MELATONIN
1000 DAILY
COMMUNITY

## 2023-11-10 NOTE — PROGRESS NOTES
[facility] - Outpatient Follow-up Note  Erika Mccann 58 y.o. female MRN: [id number]  Encounter: [id number]          ASSESSMENT AND PLAN:      #1 chronic hepatitis C:  I suspect her transaminase elevation and mild fatigue is related to her chronic hepatitis C infection. At this point in time we will treat her hepatitis C and continue to follow her LFTs. If they remain elevated after achieving cure, we can further assess for autoimmune hepatitis. I will obtain hepatitis C genotype, elastography, HIV antibody testing and we will submit for treatment with 8 weeks of Mavyret. We discussed the details of treatment with her including the risks and benefits, particularly decreasing her risk for developing ongoing hepatic fibrosis which can lead to cirrhosis. She understands she will need monthly blood work and will follow-up with me at the end of treatment. FOLLOW-UP:  Return in about 3 months (around 2/10/2024). VISIT DIAGNOSES AND ORDERS:      1. Elevated liver function tests    2. Chronic hepatitis C without hepatic coma ([address])      Orders Placed This Encounter   Procedures    US elastography     ______________________________________________________________________    SUBJECTIVE:       Interval update 11/10/2023:  Ms. Nicolette Hanna had the blood work and ultrasound done as requested after her last office visit. Blood work showed elevated transaminases to 2 times upper limit of normal.  Extensive serologic work-up showed hepatitis C antibody positivity with reflux when positive viral load indicating active infection. Autoimmune work-up also showed a positive MERRITT and smooth muscle antibody however with mildly elevated total IgG at 1.5 g.  Abdominal ultrasound showed a normal appearing liver and spleen. Review of systems are unchanged since last week.       History:    HPI from office visit 11/6/2023:  A/P:  Mildly elevated transaminases to 1.5 times the upper limit of normal:     No clear etiology based on history, however there are several reports of black seed oil causing elevated transaminases. It is very likely, however, she has had low levels of elevated transaminases prior to starting it. Chronic Senna use has also been associated with causes of liver injury. Given that she has not had laboratory work-up to rule out the more common underlying causes of chronic liver disease, I have requested extensive serologic evaluation to rule out autoimmune and viral hepatitis, hemochromatosis and alpha-1 antitrypsin deficiency. I have also requested abdominal ultrasound to evaluate her liver and spleen and to rule out signs of fatty liver disease and cirrhosis/portal hypertension. She will stop taking black seed oil and have repeat LFTs. If LFTs remain elevated we can do a second round of blood work, hold her senna tea, using MiraLAX, and continue to follow LFTs. If LFTs still remain elevated, we can consider doing a biopsy to evaluate histologically. Colon cancer screening:  Colonoscopy done early in 2022 showed a redundant right colon and the removal of 3 polyps, none of which were adenomatous. Repeat colonoscopy in 9 years. HPI:  Ms. Anival Umanzor is a 58 y.o. female with medical history as outlined below, including chronic idiopathic constipation, arthritis of the big toe, cervical radiculopathy, chronic opioid dependence who is being evaluated for to surgery and blood work showed elevated transaminases with an AST of 57 and ALT of 62. She has a history of mildly elevated transaminases going back several years, as far back as 2019 in our system with a slightly elevated ALT of 36. Thorough review of blood work done to date does not show any work-up of underlying chronic liver disease. She not had any abdominal imaging. She did have a colonoscopy in 2022. She leads a self-reported healthy lifestyle with organic, whole food diet.   She does take black seed oil supplements for the last several years. She does not drink alcohol. She drinks senna tea every evening to help with her constipation. Ms. Domingo Shea denies recent or history of yellow eyes/skin, dark urine, GI bleeding, abdominal distention She denies abdominal pain, nausea, vomiting, heartburn, reflux, difficulty swallowing, early satiety, bloating, diarrhea, or straining with passing stools. She does occasionally have LLQ discomfort prior to having a bowel movement. REVIEW OF SYSTEMS     Review of Systems   All other systems reviewed and are negative.       Historical Information   Patient Active Problem List   Diagnosis    Lower back pain    Neck pain    Slow transit constipation    Right upper quadrant abdominal pain    Visual disturbance    Tension type headache    Dizziness    Memory loss    Anxiety    Scalp mass    Acoustic neuroma (HCC)    Preop examination    Arthritis of big toe    Abnormal brain MRI    Cervical radiculopathy    Right ear pain    Chronic otitis media with serous effusion    Arthritis    Hallux rigidus of right foot    Numbness of foot    Other bursitis, not elsewhere classified, right ankle and foot    Abrasion of left cornea    Mild protein-calorie malnutrition (HCC)    Intractable migraine without aura and without status migrainosus    Continuous opioid dependence (HCC)    Head trauma    Blurred vision    Facial pain    Toe pain, chronic, left    Abnormal ECG    Other chest pain     Social History     Substance and Sexual Activity   Alcohol Use No     Social History     Substance and Sexual Activity   Drug Use No     Social History     Tobacco Use   Smoking Status Former    Types: Cigarettes    Quit date: 2014    Years since quittin.8   Smokeless Tobacco Never   Tobacco Comments    on and off over 40 years , less than a pck per day       Meds/Allergies       Current Outpatient Medications:     cholecalciferol (VITAMIN D3) 1,000 units tablet    Multiple Vitamin (MULTIVITAMIN) tablet    Cholecalciferol (Vitamin D3) 1.25 MG (76512 UT) TABS    ergocalciferol (ERGOCALCIFEROL) 1.25 MG (02221 UT) capsule    omeprazole (PriLOSEC) 20 mg delayed release capsule    oxyCODONE (ROXICODONE) 5 immediate release tablet    pregabalin (LYRICA) 150 mg capsule    traMADol (ULTRAM) 50 mg tablet    Allergies   Allergen Reactions    Other Anaphylaxis     Hazelnuts    Psyllium Itching    Latex      Added based on information entered during case entry, please review and add reactions, type, and severity as needed    Sulfa Antibiotics            Objective     Blood pressure 100/72, pulse 84, temperature 98.2 °F (36.8 °C), temperature source Tympanic, height 5' 5" (1.651 m), weight 45.5 kg (100 lb 6.4 oz). Body mass index is 16.71 kg/m². PHYSICAL EXAM:      Physical Exam  Vitals reviewed. Constitutional:       General: She is not in acute distress. Appearance: Normal appearance. She is not ill-appearing. Comments: thin   HENT:      Head: Normocephalic and atraumatic. Nose: Nose normal.      Mouth/Throat:      Mouth: Mucous membranes are moist.      Pharynx: Oropharynx is clear. Eyes:      General: No scleral icterus. Extraocular Movements: Extraocular movements intact. Cardiovascular:      Rate and Rhythm: Normal rate and regular rhythm. Heart sounds: No murmur heard. Pulmonary:      Effort: Pulmonary effort is normal. No respiratory distress. Breath sounds: Normal breath sounds. Abdominal:      General: Abdomen is flat. Palpations: Abdomen is soft. There is no shifting dullness, fluid wave, hepatomegaly or splenomegaly. Tenderness: There is no abdominal tenderness. Hernia: No hernia is present. Genitourinary:     Comments: deferred  Musculoskeletal:         General: No swelling. Normal range of motion. Cervical back: Normal range of motion and neck supple. No tenderness. Skin:     General: Skin is warm.       Coloration: Skin is not jaundiced. Findings: No bruising or rash. Neurological:      General: No focal deficit present. Mental Status: She is alert and oriented to person, place, and time. Psychiatric:         Mood and Affect: Mood normal.         Lab Results:   Lab Results   Component Value Date    K 4.1 11/01/2023    CO2 30 11/01/2023     11/01/2023    BUN 16 11/01/2023    CREATININE 0.74 11/01/2023     Lab Results   Component Value Date    WBC 7.2 11/06/2023    HGB 14.6 11/06/2023    HCT 42.6 11/06/2023    MCV 94.0 11/06/2023     11/06/2023     Lab Results   Component Value Date    TP 7.5 11/01/2023    AST 57 (H) 11/01/2023    ALT 62 (H) 11/01/2023    INR 1.1 11/06/2023      Lab Results   Component Value Date    IRON 112 11/06/2023     Lab Results   Component Value Date    HDL 80 11/01/2023    TRIG 44 11/01/2023         Radiology Results:   US abdomen complete    Result Date: 11/10/2023  Narrative: ABDOMEN ULTRASOUND, COMPLETE INDICATION:   R79.89: Other specified abnormal findings of blood chemistry. Elevated LFTs. COMPARISON:  None TECHNIQUE:   Real-time ultrasound of the abdomen was performed with a curvilinear transducer with both volumetric sweeps and still imaging techniques. FINDINGS: PANCREAS:  Visualized portions of the pancreas are within normal limits. AORTA AND IVC:  Visualized portions are normal for patient age. LIVER: Size:  Within normal range. The liver measures 17.1 cm in the midclavicular line. Contour:  Surface contour is smooth. Parenchyma:  Echogenicity and echotexture are within normal limits. No liver mass identified. Limited imaging of the main portal vein shows it to be patent and hepatopetal. BILIARY: The gallbladder is normal in caliber. No wall thickening or pericholecystic fluid. No stones or sludge identified. No sonographic Hill's sign. No intrahepatic biliary dilatation. CBD measures 5.0 mm. No choledocholithiasis. KIDNEY: Right kidney measures 10.3 x 4.9 x 5.2  cm.  Volume 136.0 mL Interpolar region thinly septated cyst measuring 2.0 cm. Left kidney measures 10.7 x 5.7 x 5.1 cm. Volume 161.9 mL A couple simple cysts present, largest measuring 1.2 cm in the lower pole. SPLEEN: Measures 9.1 x 9.5 x 4.7 cm. Volume 213.6 mL Within normal limits. ASCITES:  None. Impression: Benign-appearing bilateral renal cysts, otherwise normal abdominal ultrasound. Resident: Enedelia Jimenez, the attending radiologist, have reviewed the images and agree with the final report above.  Workstation performed: Simi Dempsey MD

## 2023-11-10 NOTE — PATIENT INSTRUCTIONS
Additional blood work as requested. Once back, we will submit for HCV treatment with Mavyret for 8 weeks. US scheduled on 11/13 at [facility].

## 2023-11-13 ENCOUNTER — APPOINTMENT (OUTPATIENT)
Dept: LAB | Facility: HOSPITAL | Age: 63
End: 2023-11-13
Payer: COMMERCIAL

## 2023-11-13 ENCOUNTER — HOSPITAL ENCOUNTER (OUTPATIENT)
Dept: ULTRASOUND IMAGING | Facility: HOSPITAL | Age: 63
Discharge: HOME | End: 2023-11-13
Attending: INTERNAL MEDICINE
Payer: COMMERCIAL

## 2023-11-13 DIAGNOSIS — R79.89 ELEVATED LIVER FUNCTION TESTS: ICD-10-CM

## 2023-11-13 DIAGNOSIS — B18.2 CHRONIC HEPATITIS C WITHOUT HEPATIC COMA (HCC): ICD-10-CM

## 2023-11-13 LAB
HBV SURFACE AB SER-ACNC: 487 MIU/ML
HBV SURFACE AG SER QL: NORMAL
HIV 1+2 AB+HIV1 P24 AG SERPL QL IA: NORMAL
HIV 2 AB SERPL QL IA: NORMAL
HIV1 AB SERPL QL IA: NORMAL
HIV1 P24 AG SERPL QL IA: NORMAL

## 2023-11-13 PROCEDURE — 86706 HEP B SURFACE ANTIBODY: CPT

## 2023-11-13 PROCEDURE — 82247 BILIRUBIN TOTAL: CPT

## 2023-11-13 PROCEDURE — 82977 ASSAY OF GGT: CPT

## 2023-11-13 PROCEDURE — 87340 HEPATITIS B SURFACE AG IA: CPT

## 2023-11-13 PROCEDURE — 87389 HIV-1 AG W/HIV-1&-2 AB AG IA: CPT

## 2023-11-13 PROCEDURE — 83883 ASSAY NEPHELOMETRY NOT SPEC: CPT

## 2023-11-13 PROCEDURE — 82172 ASSAY OF APOLIPOPROTEIN: CPT

## 2023-11-13 PROCEDURE — 36415 COLL VENOUS BLD VENIPUNCTURE: CPT

## 2023-11-13 PROCEDURE — 84460 ALANINE AMINO (ALT) (SGPT): CPT

## 2023-11-13 PROCEDURE — 83010 ASSAY OF HAPTOGLOBIN QUANT: CPT

## 2023-11-13 PROCEDURE — 76981 USE PARENCHYMA: CPT

## 2023-11-13 NOTE — TELEPHONE ENCOUNTER
Spoke with pt. All questions and concerns addressed to pt's satisfaction. Reviewed Hep C treatment & prior auth process. Pt advised she prefers her mobile number to be called and to leave messages.

## 2023-11-14 ENCOUNTER — TELEPHONE (OUTPATIENT)
Dept: CARDIOLOGY CLINIC | Facility: CLINIC | Age: 63
End: 2023-11-14

## 2023-11-14 DIAGNOSIS — R07.89 OTHER CHEST PAIN: ICD-10-CM

## 2023-11-14 DIAGNOSIS — R94.31 ABNORMAL ECG: Primary | ICD-10-CM

## 2023-11-14 DIAGNOSIS — R07.9 CHEST PAIN, UNSPECIFIED TYPE: ICD-10-CM

## 2023-11-14 DIAGNOSIS — I21.29 SEPTAL INFARCTION (HCC): ICD-10-CM

## 2023-11-14 NOTE — TELEPHONE ENCOUNTER
The auth for the echo stress has now been denied. P2P is no longer an option. Please call patient and let her know and advise if Dr. Alyssa Foster would like patient to have another test.  Please let patient know I have cancelled her test from her appointment desk. Thank you.

## 2023-11-14 NOTE — TELEPHONE ENCOUNTER
Called pt, left a  with information on stress test ordered per Dr. Dutch Hou. Provided central scheduling number to arrange stress test only.

## 2023-11-15 NOTE — TELEPHONE ENCOUNTER
Stress tests do not require auth. Have the patient contact central scheduling to see if there's availability to have both on the same date. If not, they will be able to look for another date where both can be done for her convenience.

## 2023-11-15 NOTE — TELEPHONE ENCOUNTER
Called pt, left a vm advising pt I was unable to get her in for the stress test tomorrow. Arranged stress test appt for 11/17/23 @1:30 pm. If this date doesn't work, pt to contact the central scheduling dept @[phone].     Echo appt  11/16/2023 @ 1:30 pm  Stress test 11/17/2023 @ 1:30 pm

## 2023-11-15 NOTE — TELEPHONE ENCOUNTER
Spoke with pt, provided pt with information in regards to stress test. Pt wants to know if she's ok to have the Echo tomorrow?

## 2023-11-16 ENCOUNTER — HOSPITAL ENCOUNTER (OUTPATIENT)
Dept: NON INVASIVE DIAGNOSTICS | Facility: CLINIC | Age: 63
Discharge: HOME | End: 2023-11-16
Payer: COMMERCIAL

## 2023-11-16 ENCOUNTER — APPOINTMENT (OUTPATIENT)
Dept: NON INVASIVE DIAGNOSTICS | Facility: CLINIC | Age: 63
End: 2023-11-16
Payer: COMMERCIAL

## 2023-11-16 VITALS
SYSTOLIC BLOOD PRESSURE: 100 MMHG | HEIGHT: 65 IN | WEIGHT: 100 LBS | HEART RATE: 74 BPM | DIASTOLIC BLOOD PRESSURE: 72 MMHG | BODY MASS INDEX: 16.66 KG/M2

## 2023-11-16 DIAGNOSIS — R07.89 OTHER CHEST PAIN: ICD-10-CM

## 2023-11-16 DIAGNOSIS — I21.29 SEPTAL INFARCTION (HCC): ICD-10-CM

## 2023-11-16 DIAGNOSIS — R94.31 ABNORMAL ECG: ICD-10-CM

## 2023-11-16 LAB
A2 MACROGLOB SERPL-MCNC: 218 MG/DL (ref 110–276)
ALT SERPL W P-5'-P-CCNC: 64 IU/L (ref 0–40)
APO A-I SERPL-MCNC: 177 MG/DL (ref 116–209)
BILIRUB SERPL-MCNC: 0.4 MG/DL (ref 0–1.2)
COMMENT: ABNORMAL
FIBROSIS SCORING:: ABNORMAL
FIBROSIS STAGE SERPL QL: ABNORMAL
GGT SERPL-CCNC: 24 IU/L (ref 0–60)
HAPTOGLOB SERPL-MCNC: 20 MG/DL (ref 37–355)
HBV SURFACE AB SER QL IA: REACTIVE
HBV SURFACE AG SERPL QL IA: NORMAL
HCV GENTYP SERPL NAA+PROBE: 3
HIV 1+2 AB+HIV1 P24 AG SERPL QL IA: NORMAL
INTERPRETATIONS: ABNORMAL
LIVER FIBR SCORE SERPL CALC.FIBROSURE: 0.37 (ref 0–0.21)
NECROINFLAMM ACTIVITY SCORING:: ABNORMAL
NECROINFLAMMATORY ACT GRADE SERPL QL: ABNORMAL
NECROINFLAMMATORY ACT SCORE SERPL: 0.41 (ref 0–0.17)
REF LAB TEST METHOD: ABNORMAL
SERVICE CMNT-IMP: ABNORMAL

## 2023-11-16 PROCEDURE — 93306 TTE W/DOPPLER COMPLETE: CPT

## 2023-11-17 ENCOUNTER — HOSPITAL ENCOUNTER (OUTPATIENT)
Dept: NON INVASIVE DIAGNOSTICS | Facility: CLINIC | Age: 63
Discharge: HOME | End: 2023-11-17
Payer: COMMERCIAL

## 2023-11-17 VITALS
SYSTOLIC BLOOD PRESSURE: 122 MMHG | HEIGHT: 65 IN | BODY MASS INDEX: 16.66 KG/M2 | OXYGEN SATURATION: 99 % | HEART RATE: 84 BPM | WEIGHT: 100 LBS | DIASTOLIC BLOOD PRESSURE: 76 MMHG

## 2023-11-17 DIAGNOSIS — R07.9 CHEST PAIN, UNSPECIFIED TYPE: ICD-10-CM

## 2023-11-17 DIAGNOSIS — I21.29 SEPTAL INFARCTION (HCC): ICD-10-CM

## 2023-11-17 DIAGNOSIS — R94.31 ABNORMAL ECG: ICD-10-CM

## 2023-11-17 DIAGNOSIS — R07.89 OTHER CHEST PAIN: ICD-10-CM

## 2023-11-17 LAB
APICAL FOUR CHAMBER EJECTION FRACTION: 61 %
E WAVE DECELERATION TIME: 219 MS
E/A RATIO: 0.69
LAAS-AP2: 10.1 CM2
LAAS-AP4: 11 CM2
LEFT ATRIUM AREA SYSTOLE SINGLE PLANE A4C: 11.7 CM2
LEFT ATRIUM VOLUME (MOD BIPLANE): 19 ML
LEFT ATRIUM VOLUME INDEX (MOD BIPLANE): 12.8 ML/M2
MAX HR PERCENT: 102 %
MAX HR: 160 BPM
MV E'TISSUE VEL-LAT: 10 CM/S
MV E'TISSUE VEL-SEP: 12 CM/S
MV PEAK A VEL: 0.99 M/S
MV PEAK E VEL: 68 CM/S
MV STENOSIS PRESSURE HALF TIME: 64 MS
MV VALVE AREA P 1/2 METHOD: 3.44
RA PRESSURE ESTIMATED: 3 MMHG
RATE PRESSURE PRODUCT: NORMAL
RIGHT ATRIUM AREA SYSTOLE A4C: 10.9 CM2
RIGHT VENTRICLE ID DIMENSION: 2.1 CM
RV PSP: 16 MMHG
SL CV LEFT ATRIUM LENGTH A2C: 4.6 CM
SL CV LV EF: 60
SL CV STRESS RECOVERY BP: NORMAL MMHG
SL CV STRESS RECOVERY HR: 107 BPM
SL CV STRESS RECOVERY O2 SAT: 98 %
SL CV STRESS STAGE REACHED: 4
STRESS ANGINA INDEX: 1
STRESS BASELINE BP: NORMAL MMHG
STRESS BASELINE HR: 84 BPM
STRESS DUKE TREADMILL SCORE: 7
STRESS O2 SAT REST: 99 %
STRESS PEAK HR: 160 BPM
STRESS POST ESTIMATED WORKLOAD: 13.4 METS
STRESS POST EXERCISE DUR MIN: 10 MIN
STRESS POST EXERCISE DUR SEC: 53 SEC
STRESS POST O2 SAT PEAK: 97 %
STRESS POST PEAK BP: 140 MMHG
STRESS ST ELEVATION: 0 MM
TR MAX PG: 13 MMHG
TR PEAK VELOCITY: 1.8 M/S
TRICUSPID ANNULAR PLANE SYSTOLIC EXCURSION: 2.4 CM
TRICUSPID VALVE PEAK REGURGITATION VELOCITY: 1.81 M/S

## 2023-11-17 PROCEDURE — 93018 CV STRESS TEST I&R ONLY: CPT | Performed by: INTERNAL MEDICINE

## 2023-11-17 PROCEDURE — 93017 CV STRESS TEST TRACING ONLY: CPT

## 2023-11-17 PROCEDURE — 93016 CV STRESS TEST SUPVJ ONLY: CPT | Performed by: INTERNAL MEDICINE

## 2023-11-17 PROCEDURE — 93306 TTE W/DOPPLER COMPLETE: CPT | Performed by: INTERNAL MEDICINE

## 2023-11-17 RX ORDER — VELPATASVIR AND SOFOSBUVIR 100; 400 MG/1; MG/1
1 TABLET, FILM COATED ORAL DAILY
Qty: 28 TABLET | Refills: 2 | Status: SHIPPED | OUTPATIENT
Start: 2023-11-17 | End: 2024-02-09

## 2023-11-17 NOTE — RESULT ENCOUNTER NOTE
Dr. Swapna Villanueva DO,  Ms. [place] results were normal and she has been notified via [address] [place] [address].

## 2023-11-17 NOTE — TELEPHONE ENCOUNTER
Epclusa 12 wk script entered for pt as preferred by insurance.      Treatment naive  Viral load [id number]  Genotype 3  Elastography F0-F1    HBV surface Ab + (Immune)

## 2023-11-20 LAB
MAX HR PERCENT: 100 %
MAX HR: 160 BPM
STRESS BASELINE BP: NORMAL MMHG
STRESS BASELINE HR: 96 BPM
STRESS POST ESTIMATED WORKLOAD: 13.4 METS
STRESS POST PEAK HR: 160 BPM
STRESS POST PEAK SYSTOLIC BP: 150 MMHG

## 2023-11-28 ENCOUNTER — TELEPHONE (OUTPATIENT)
Age: 63
End: 2023-11-28

## 2024-01-04 ENCOUNTER — APPOINTMENT (OUTPATIENT)
Dept: LAB | Facility: HOSPITAL | Age: 64
End: 2024-01-04
Payer: COMMERCIAL

## 2024-01-04 DIAGNOSIS — B19.20 HEPATITIS C TEST POSITIVE: ICD-10-CM

## 2024-01-04 LAB
ALBUMIN SERPL BCP-MCNC: 4.5 G/DL (ref 3.5–5)
ALP SERPL-CCNC: 86 U/L (ref 34–104)
ALT SERPL W P-5'-P-CCNC: 18 U/L (ref 7–52)
ANION GAP SERPL CALCULATED.3IONS-SCNC: 6 MMOL/L
AST SERPL W P-5'-P-CCNC: 26 U/L (ref 13–39)
BASOPHILS # BLD AUTO: 0.03 THOUSANDS/ÂΜL (ref 0–0.1)
BASOPHILS NFR BLD AUTO: 0 % (ref 0–1)
BILIRUB SERPL-MCNC: 0.61 MG/DL (ref 0.2–1)
BUN SERPL-MCNC: 18 MG/DL (ref 5–25)
CALCIUM SERPL-MCNC: 9.3 MG/DL (ref 8.4–10.2)
CHLORIDE SERPL-SCNC: 103 MMOL/L (ref 96–108)
CO2 SERPL-SCNC: 28 MMOL/L (ref 21–32)
CREAT SERPL-MCNC: 0.77 MG/DL (ref 0.6–1.3)
EOSINOPHIL # BLD AUTO: 0.13 THOUSAND/ÂΜL (ref 0–0.61)
EOSINOPHIL NFR BLD AUTO: 2 % (ref 0–6)
ERYTHROCYTE [DISTWIDTH] IN BLOOD BY AUTOMATED COUNT: 11.6 % (ref 11.6–15.1)
GFR SERPL CREATININE-BSD FRML MDRD: 81 ML/MIN/1.73SQ M
GLUCOSE SERPL-MCNC: 86 MG/DL (ref 65–140)
HCT VFR BLD AUTO: 46.4 % (ref 34.8–46.1)
HGB BLD-MCNC: 15.3 G/DL (ref 11.5–15.4)
IMM GRANULOCYTES # BLD AUTO: 0.02 THOUSAND/UL (ref 0–0.2)
IMM GRANULOCYTES NFR BLD AUTO: 0 % (ref 0–2)
LYMPHOCYTES # BLD AUTO: 2.71 THOUSANDS/ÂΜL (ref 0.6–4.47)
LYMPHOCYTES NFR BLD AUTO: 40 % (ref 14–44)
MCH RBC QN AUTO: 31.7 PG (ref 26.8–34.3)
MCHC RBC AUTO-ENTMCNC: 33 G/DL (ref 31.4–37.4)
MCV RBC AUTO: 96 FL (ref 82–98)
MONOCYTES # BLD AUTO: 0.5 THOUSAND/ÂΜL (ref 0.17–1.22)
MONOCYTES NFR BLD AUTO: 7 % (ref 4–12)
NEUTROPHILS # BLD AUTO: 3.33 THOUSANDS/ÂΜL (ref 1.85–7.62)
NEUTS SEG NFR BLD AUTO: 51 % (ref 43–75)
NRBC BLD AUTO-RTO: 0 /100 WBCS
PLATELET # BLD AUTO: 291 THOUSANDS/UL (ref 149–390)
PMV BLD AUTO: 10.6 FL (ref 8.9–12.7)
POTASSIUM SERPL-SCNC: 3.5 MMOL/L (ref 3.5–5.3)
PROT SERPL-MCNC: 7.7 G/DL (ref 6.4–8.4)
RBC # BLD AUTO: 4.83 MILLION/UL (ref 3.81–5.12)
SODIUM SERPL-SCNC: 137 MMOL/L (ref 135–147)
WBC # BLD AUTO: 6.72 THOUSAND/UL (ref 4.31–10.16)

## 2024-01-04 PROCEDURE — 80053 COMPREHEN METABOLIC PANEL: CPT

## 2024-01-04 PROCEDURE — 85025 COMPLETE CBC W/AUTO DIFF WBC: CPT

## 2024-01-04 PROCEDURE — 87522 HEPATITIS C REVRS TRNSCRPJ: CPT

## 2024-01-04 PROCEDURE — 87521 HEPATITIS C PROBE&RVRS TRNSC: CPT

## 2024-01-04 PROCEDURE — 36415 COLL VENOUS BLD VENIPUNCTURE: CPT

## 2024-01-05 LAB — HCV RNA SERPL NAA+PROBE-ACNC: NOT DETECTED K[IU]/ML

## 2024-02-08 ENCOUNTER — TELEPHONE (OUTPATIENT)
Age: 64
End: 2024-02-08

## 2024-02-08 NOTE — TELEPHONE ENCOUNTER
Patients GI provider:  Dr. Jernigan    Number to return call: [phone]    Reason for call: Pt calling with [employer] on the line to say that a diagnosis code for her lab work needs to be changed. [employer] is coding it experimental and not covering it. Pt would like a call back to change the diagnosis code for her Hepatitis C RNA, Quantitative PCR, . Please reach out to the pt    Scheduled procedure/appointment date if applicable: Apt/procedure

## 2024-02-08 NOTE — TELEPHONE ENCOUNTER
incoming call transferred from access center, spoke with pt reporting [employer] is not covering her antimitochondrial antibody test. And charging her $142 as the diagnosis code used is considered an experimental test.  insurance advised pt that code needed to be changed by provider and sent to [employer] to be paid.      Pt expressed many concerns over the information contained in her medical records being incorrect. Pt will address at 3/4/24 OV with Dr Jernigan and PCP.    Pt reports feeling better since starting mavyret, she received her refill and will call to confirm her last dose.

## 2024-02-08 NOTE — TELEPHONE ENCOUNTER
LVM for patient to call back to discuss. Diagnosis code used for 4 wk mid tx bw was B19.20 (Hepatitis C positive). Other code that can be used B18.2 chronic hepatitis C

## 2024-02-29 ENCOUNTER — OFFICE VISIT (OUTPATIENT)
Dept: FAMILY MEDICINE CLINIC | Facility: CLINIC | Age: 64
End: 2024-02-29
Payer: COMMERCIAL

## 2024-02-29 VITALS
HEIGHT: 65 IN | OXYGEN SATURATION: 98 % | SYSTOLIC BLOOD PRESSURE: 120 MMHG | DIASTOLIC BLOOD PRESSURE: 64 MMHG | BODY MASS INDEX: 17.36 KG/M2 | WEIGHT: 104.2 LBS | TEMPERATURE: 98.3 F | HEART RATE: 94 BPM

## 2024-02-29 DIAGNOSIS — Z00.00 WELL ADULT EXAM: Primary | ICD-10-CM

## 2024-02-29 PROBLEM — F11.20 CONTINUOUS OPIOID DEPENDENCE (HCC): Status: RESOLVED | Noted: 2022-05-18 | Resolved: 2024-02-29

## 2024-02-29 PROBLEM — R51.9 FACIAL PAIN: Status: RESOLVED | Noted: 2023-07-28 | Resolved: 2024-02-29

## 2024-02-29 PROBLEM — R22.0 SCALP MASS: Status: RESOLVED | Noted: 2019-02-28 | Resolved: 2024-02-29

## 2024-02-29 PROBLEM — R20.0 NUMBNESS OF FOOT: Status: RESOLVED | Noted: 2021-08-11 | Resolved: 2024-02-29

## 2024-02-29 PROBLEM — R41.3 MEMORY LOSS: Status: RESOLVED | Noted: 2019-02-28 | Resolved: 2024-02-29

## 2024-02-29 PROBLEM — M79.675 TOE PAIN, CHRONIC, LEFT: Status: RESOLVED | Noted: 2023-10-20 | Resolved: 2024-02-29

## 2024-02-29 PROBLEM — M19.90 ARTHRITIS: Status: RESOLVED | Noted: 2021-08-11 | Resolved: 2024-02-29

## 2024-02-29 PROBLEM — G89.29 TOE PAIN, CHRONIC, LEFT: Status: RESOLVED | Noted: 2023-10-20 | Resolved: 2024-02-29

## 2024-02-29 PROBLEM — H92.01 RIGHT EAR PAIN: Status: RESOLVED | Noted: 2020-04-14 | Resolved: 2024-02-29

## 2024-02-29 PROBLEM — H65.20 CHRONIC OTITIS MEDIA WITH SEROUS EFFUSION: Status: RESOLVED | Noted: 2021-08-11 | Resolved: 2024-02-29

## 2024-02-29 PROBLEM — R10.11 RIGHT UPPER QUADRANT ABDOMINAL PAIN: Status: RESOLVED | Noted: 2019-01-14 | Resolved: 2024-02-29

## 2024-02-29 PROBLEM — M20.21 HALLUX RIGIDUS OF RIGHT FOOT: Status: RESOLVED | Noted: 2020-10-02 | Resolved: 2024-02-29

## 2024-02-29 PROBLEM — G43.019 INTRACTABLE MIGRAINE WITHOUT AURA AND WITHOUT STATUS MIGRAINOSUS: Status: RESOLVED | Noted: 2022-05-18 | Resolved: 2024-02-29

## 2024-02-29 PROBLEM — R42 DIZZINESS: Status: RESOLVED | Noted: 2019-02-28 | Resolved: 2024-02-29

## 2024-02-29 PROBLEM — F41.9 ANXIETY: Status: RESOLVED | Noted: 2019-02-28 | Resolved: 2024-02-29

## 2024-02-29 PROBLEM — M71.571 OTHER BURSITIS, NOT ELSEWHERE CLASSIFIED, RIGHT ANKLE AND FOOT: Status: RESOLVED | Noted: 2020-10-02 | Resolved: 2024-02-29

## 2024-02-29 PROBLEM — H53.8 BLURRED VISION: Status: RESOLVED | Noted: 2023-07-28 | Resolved: 2024-02-29

## 2024-02-29 PROBLEM — Z01.818 PREOP EXAMINATION: Status: RESOLVED | Noted: 2019-10-16 | Resolved: 2024-02-29

## 2024-02-29 PROBLEM — S05.02XA ABRASION OF LEFT CORNEA: Status: RESOLVED | Noted: 2021-10-26 | Resolved: 2024-02-29

## 2024-02-29 PROBLEM — R07.89 OTHER CHEST PAIN: Status: RESOLVED | Noted: 2023-10-30 | Resolved: 2024-02-29

## 2024-02-29 PROBLEM — R94.31 ABNORMAL ECG: Status: RESOLVED | Noted: 2023-10-30 | Resolved: 2024-02-29

## 2024-02-29 PROBLEM — S09.90XA HEAD TRAUMA: Status: RESOLVED | Noted: 2023-07-28 | Resolved: 2024-02-29

## 2024-02-29 PROCEDURE — 99396 PREV VISIT EST AGE 40-64: CPT | Performed by: FAMILY MEDICINE

## 2024-02-29 NOTE — PROGRESS NOTES
"Assessment/Plan: Vaccines reviewed.  Laboratory studies reviewed and up-to-date.  Patient will follow-up with GI regarding hep C.  Patient is finishing treatment.  Patient will consider mammogram and gynecologist in the future.  Patient up-to-date with colonoscopy.  Patient feeling well overall.  Follow-up yearly or as needed       Diagnoses and all orders for this visit:    Well adult exam            Subjective:        Patient ID: Judy Rosen is a 63 y.o. female.      Patient is here for wellness exam.  Labs and vaccines reviewed.  Patient did have colonoscopy.  Patient does not see gynecologist or for mammogram.          The following portions of the patient's history were reviewed and updated as appropriate: allergies, current medications, past family history, past medical history, past social history, past surgical history and problem list.      Review of Systems   Constitutional: Negative.    HENT: Negative.     Eyes: Negative.    Respiratory: Negative.     Cardiovascular: Negative.    Gastrointestinal: Negative.    Endocrine: Negative.    Genitourinary: Negative.    Musculoskeletal: Negative.    Skin: Negative.    Allergic/Immunologic: Negative.    Neurological: Negative.    Hematological: Negative.    Psychiatric/Behavioral: Negative.             Objective:        Depression Screening and Follow-up Plan: Clincally patient does not have depression. No treatment is required.             /64 (BP Location: Right arm, Patient Position: Sitting, Cuff Size: Standard)   Pulse 94   Temp 98.3 °F (36.8 °C) (Temporal)   Ht 5' 5\" (1.651 m)   Wt 47.3 kg (104 lb 3.2 oz)   SpO2 98%   BMI 17.34 kg/m²          Physical Exam  Vitals and nursing note reviewed.   Constitutional:       General: She is not in acute distress.     Appearance: Normal appearance. She is not ill-appearing, toxic-appearing or diaphoretic.   HENT:      Head: Normocephalic and atraumatic.      Right Ear: Tympanic membrane, ear canal and " external ear normal. There is no impacted cerumen.      Left Ear: Tympanic membrane, ear canal and external ear normal. There is no impacted cerumen.      Nose: Nose normal. No congestion or rhinorrhea.      Mouth/Throat:      Mouth: Mucous membranes are moist.      Pharynx: No oropharyngeal exudate or posterior oropharyngeal erythema.   Eyes:      General: No scleral icterus.        Right eye: No discharge.         Left eye: No discharge.   Neck:      Vascular: No carotid bruit.   Cardiovascular:      Rate and Rhythm: Normal rate and regular rhythm.      Pulses: Normal pulses.      Heart sounds: Normal heart sounds. No murmur heard.     No friction rub. No gallop.   Pulmonary:      Effort: Pulmonary effort is normal. No respiratory distress.      Breath sounds: Normal breath sounds. No stridor. No wheezing, rhonchi or rales.   Chest:      Chest wall: No tenderness.   Musculoskeletal:         General: No swelling, tenderness, deformity or signs of injury. Normal range of motion.      Cervical back: Normal range of motion and neck supple. No rigidity. No muscular tenderness.      Right lower leg: No edema.      Left lower leg: No edema.   Lymphadenopathy:      Cervical: No cervical adenopathy.   Skin:     General: Skin is warm and dry.      Capillary Refill: Capillary refill takes less than 2 seconds.      Coloration: Skin is not jaundiced.      Findings: No bruising, erythema, lesion or rash.   Neurological:      Mental Status: She is alert and oriented to person, place, and time. Mental status is at baseline.      Cranial Nerves: No cranial nerve deficit.      Sensory: No sensory deficit.      Motor: No weakness.      Coordination: Coordination normal.      Gait: Gait normal.   Psychiatric:         Mood and Affect: Mood normal.         Behavior: Behavior normal.         Thought Content: Thought content normal.         Judgment: Judgment normal.

## 2024-03-04 ENCOUNTER — OFFICE VISIT (OUTPATIENT)
Dept: GASTROENTEROLOGY | Facility: CLINIC | Age: 64
End: 2024-03-04
Payer: COMMERCIAL

## 2024-03-04 ENCOUNTER — TELEPHONE (OUTPATIENT)
Age: 64
End: 2024-03-04

## 2024-03-04 VITALS
HEART RATE: 75 BPM | BODY MASS INDEX: 17.16 KG/M2 | HEIGHT: 65 IN | SYSTOLIC BLOOD PRESSURE: 101 MMHG | WEIGHT: 103 LBS | TEMPERATURE: 98.2 F | DIASTOLIC BLOOD PRESSURE: 66 MMHG

## 2024-03-04 DIAGNOSIS — B18.2 CHRONIC HEPATITIS C WITHOUT HEPATIC COMA (HCC): Primary | ICD-10-CM

## 2024-03-04 PROCEDURE — 99213 OFFICE O/P EST LOW 20 MIN: CPT | Performed by: INTERNAL MEDICINE

## 2024-03-04 NOTE — TELEPHONE ENCOUNTER
Patients GI provider:  Dr. Jernigan    Number to return call: [phone]    Reason for call: Pt called in for directions for her appt. Transferred her over and Deedee in  office assisted pt.     Scheduled procedure/appointment date if applicable: Appt 3/4/24

## 2024-03-04 NOTE — PROGRESS NOTES
[facility] - Outpatient Follow-up Note  Judy Rosen 63 y.o. female MRN: [id number]  Encounter: [id number]          ASSESSMENT AND PLAN:      1. Chronic hepatitis C without hepatic coma (HCC)  Genotype 3. Treated with 12 weeks of Epclusa. Last dose was yesterday.   Elastography in 11/2023 showed F0-F1. Feeling much better today.   We will obtain Hepatitis C RNA, now and on 5/27/24 which is going to be 12 weeks after her last dose of Epclusa  ______________________________________________________________________    SUBJECTIVE:      63-year-old female who presents today for follow-up.    Patient diagnosed with hepatitis C (genotype 3) with positive HCV viral load in November 2023.  Subsequently had elastography done which showed F0 to F1 and was subsequently started on Epclusa x 12 w. Finished treatment yesterday.    Feels very energetic since the start of treatment, has noticed improvement in mood, appetite. Overall feels great. Denies any major complaints. She has been trying to figure out where she may have got it from.     REVIEW OF SYSTEMS IS OTHERWISE NEGATIVE.      Historical Information   Past Medical History:   Diagnosis Date   • Acoustic neuroma (HCC)    • Anxiety    • Chronic pain disorder     neck and back pain   • Headache(784.0)    • Pituitary adenoma (HCC)     monitored   • Tension headache     vs migraine     Past Surgical History:   Procedure Laterality Date   • CHEILECTOMY Right 11/12/2019    Procedure: CHEILECTOMY;  Surgeon: Cristina You DPM;  Location: [facility] OR;  Service: Podiatry     Social History   Social History     Substance and Sexual Activity   Alcohol Use No     Social History     Substance and Sexual Activity   Drug Use No     Social History     Tobacco Use   Smoking Status Former   • Current packs/day: 0.00   • Types: Cigarettes   • Quit date: 1/14/2014   • Years since quitting: 10.1   Smokeless Tobacco Never   Tobacco Comments    on and off over 40 years ,  "less than a pck per day     Family History   Problem Relation Age of Onset   • Dementia Father           2018   • Diabetes Family    • Leukemia Family        Meds/Allergies       Current Outpatient Medications:   •  Multiple Vitamin (MULTIVITAMIN) tablet    Allergies   Allergen Reactions   • Other Anaphylaxis     Hazelnuts   • Psyllium Itching   • Latex      Added based on information entered during case entry, please review and add reactions, type, and severity as needed   • Sulfa Antibiotics            Objective     Blood pressure 101/66, pulse 75, temperature 98.2 °F (36.8 °C), temperature source Tympanic, height 5' 5\" (1.651 m), weight 46.7 kg (103 lb). Body mass index is 17.14 kg/m².      PHYSICAL EXAM:      General Appearance:   Alert, cooperative, no distress   HEENT:   Normocephalic    Respi:   No labored breathing, able to speak in full sentences   Heart::   Normal rate on pulse palpation   Abdomen:   Non tender   Genitalia:   Deferred    Rectal:   Deferred    Extremities:  No cyanosis, clubbing or edema    Pulses:  2+ and symmetric    Skin:  No jaundice   Lymph nodes:  No palpable cervical lymphadenopathy        Lab Results:   No visits with results within 1 Day(s) from this visit.   Latest known visit with results is:   Appointment on 2024   Component Date Value   • Sodium 2024 137    • Potassium 2024 3.5    • Chloride 2024 103    • CO2 2024 28    • ANION GAP 2024 6    • BUN 2024 18    • Creatinine 2024 0.77    • Glucose 2024 86    • Calcium 2024 9.3    • AST 2024 26    • ALT 2024 18    • Alkaline Phosphatase 2024 86    • Total Protein 2024 7.7    • Albumin 2024 4.5    • Total Bilirubin 2024 0.61    • eGFR 2024 81    • WBC 2024 6.72    • RBC 2024 4.83    • Hemoglobin 2024 15.3    • Hematocrit 2024 46.4 (H)    • MCV 2024 96    • MCH 2024 31.7    • MCHC " 01/04/2024 33.0    • RDW 01/04/2024 11.6    • MPV 01/04/2024 10.6    • Platelets 01/04/2024 291    • nRBC 01/04/2024 0    • Neutrophils Relative 01/04/2024 51    • Immat GRANS % 01/04/2024 0    • Lymphocytes Relative 01/04/2024 40    • Monocytes Relative 01/04/2024 7    • Eosinophils Relative 01/04/2024 2    • Basophils Relative 01/04/2024 0    • Neutrophils Absolute 01/04/2024 3.33    • Immature Grans Absolute 01/04/2024 0.02    • Lymphocytes Absolute 01/04/2024 2.71    • Monocytes Absolute 01/04/2024 0.50    • Eosinophils Absolute 01/04/2024 0.13    • Basophils Absolute 01/04/2024 0.03    • HCV TARGET 01/04/2024 Not Detected          Radiology Results:   No results found.    Mihir Jernigan MD  Fellow  Gastroenterology  [facility]

## 2024-03-07 ENCOUNTER — LAB (OUTPATIENT)
Dept: LAB | Facility: HOSPITAL | Age: 64
End: 2024-03-07
Payer: COMMERCIAL

## 2024-03-07 DIAGNOSIS — B18.2 CHRONIC HEPATITIS C WITHOUT HEPATIC COMA (HCC): ICD-10-CM

## 2024-03-07 LAB
ALBUMIN SERPL BCP-MCNC: 4.2 G/DL (ref 3.5–5)
ALP SERPL-CCNC: 78 U/L (ref 34–104)
ALT SERPL W P-5'-P-CCNC: 11 U/L (ref 7–52)
ANION GAP SERPL CALCULATED.3IONS-SCNC: 4 MMOL/L
AST SERPL W P-5'-P-CCNC: 18 U/L (ref 13–39)
BILIRUB SERPL-MCNC: 0.62 MG/DL (ref 0.2–1)
BUN SERPL-MCNC: 13 MG/DL (ref 5–25)
CALCIUM SERPL-MCNC: 9.1 MG/DL (ref 8.4–10.2)
CHLORIDE SERPL-SCNC: 105 MMOL/L (ref 96–108)
CO2 SERPL-SCNC: 30 MMOL/L (ref 21–32)
CREAT SERPL-MCNC: 0.72 MG/DL (ref 0.6–1.3)
GFR SERPL CREATININE-BSD FRML MDRD: 88 ML/MIN/1.73SQ M
GLUCOSE SERPL-MCNC: 96 MG/DL (ref 65–140)
POTASSIUM SERPL-SCNC: 4.1 MMOL/L (ref 3.5–5.3)
PROT SERPL-MCNC: 7.2 G/DL (ref 6.4–8.4)
SODIUM SERPL-SCNC: 139 MMOL/L (ref 135–147)

## 2024-03-07 PROCEDURE — 36415 COLL VENOUS BLD VENIPUNCTURE: CPT

## 2024-03-07 PROCEDURE — 87521 HEPATITIS C PROBE&RVRS TRNSC: CPT

## 2024-03-07 PROCEDURE — 87522 HEPATITIS C REVRS TRNSCRPJ: CPT

## 2024-03-07 PROCEDURE — 80053 COMPREHEN METABOLIC PANEL: CPT

## 2024-03-08 LAB — HCV RNA SERPL NAA+PROBE-ACNC: NOT DETECTED K[IU]/ML

## 2024-03-08 NOTE — RESULT ENCOUNTER NOTE
Hi Ms./Mrs. Rosen  I am happy to report that the blood work shows normal liver function as well as undetectable hepatitis C level which is great news.  Please let me know if you have any questions or concerns.  We should obtain another blood work at the end of 12 weeks after your treatment as we discussed during the office visit.

## 2024-04-29 PROBLEM — Z00.00 WELL ADULT EXAM: Status: RESOLVED | Noted: 2024-02-29 | Resolved: 2024-04-29

## 2024-05-23 LAB
HCV AB SERPL QL IA: REACTIVE
HCV RNA SERPL NAA+PROBE-ACNC: ABNORMAL IU/ML
HCV RNA SERPL NAA+PROBE-LOG IU: ABNORMAL LOG IU/ML

## 2024-05-28 NOTE — RESULT ENCOUNTER NOTE
Samm Kim  I hope you are doing well.  The blood work that you obtained yesterday shows that the treatment for hepatitis C has been successful.  The infection has been eradicated.  Hepatitis C antibody is positive which I expect you to have for lifelong but that is a sign of prior infection and not an active infection as the viral load is now nondetectable. Please let me know or call our office if you have any questions or concerns.    Mihir Jernigan MD  Fellow   Department of Gastroenterology  [facility] [place] [place].     Sent mcm as above Subjective:       Patient ID: Gilberto Lee is a 74 y.o. male.    Chief Complaint: Follow-up (htn); Hypertension; Hyperlipidemia; and Depression    Was a 74-year-old male here for follow-up after resection of his parathyroid adenoma which is causing primary hyperparathyroidism with symptoms of irritability fatigue and depression but not necessarily escalated hypertension. He was with labile hypertension and was on verapamil at one time both for migraine suppression but this was discontinued even before the parathyroidectomy. He does feel very good on as far as his energy level and his blood pressure has been for the most part normal although he did have a few elevated ones when he goes to see a psychiatrist which he sees monthly for depression. She doesn't record the exact numbers but he believes his systolic was a little over 140. He has no symptoms of hypertension with headaches, chest pain, shortness of breath etc.    He's been on Wellbutrin 75 mg short acting 2 tablets 3 times a day and states that a few years ago he did try the extended release but didn't feel good on it. We did review the fact that Wellbutrin can exacerbate  migraines and can even bring on new onset migraines especially the short acting form. He was willing to revisit at least  mg twice a day but took it on a written prescription as he just got a whole bunch of the short acting Wellbutrin 75 mg.    We also discussed his migraines and he gets them quite often for 74-year-old gentleman and there is no rhyme reason he states that when the headaches come on. He will take a fourth of an Imitrex 100 mg and it seems to do the trick stories very satisfied with this medication.      Review of Systems   Constitutional: Negative for activity change, diaphoresis, fatigue and fever.   HENT: Negative for congestion, ear pain, postnasal drip, sinus pressure, sore throat and trouble swallowing.    Eyes: Negative for pain.   Respiratory: Negative for  cough, chest tightness, shortness of breath and wheezing.    Cardiovascular: Negative for chest pain, palpitations and leg swelling.   Gastrointestinal: Negative for abdominal distention, abdominal pain, blood in stool, constipation and diarrhea.   Endocrine: Negative for cold intolerance and heat intolerance.   Genitourinary: Negative for decreased urine volume, difficulty urinating, dysuria, flank pain, frequency and hematuria.   Musculoskeletal: Negative for arthralgias, back pain, joint swelling, myalgias and neck pain.   Skin: Negative for pallor, rash and wound.   Neurological: Positive for headaches. Negative for tremors, syncope, weakness, light-headedness and numbness.   Hematological: Negative for adenopathy.   Psychiatric/Behavioral: Negative for confusion, decreased concentration, hallucinations, self-injury, sleep disturbance and suicidal ideas. The patient is not nervous/anxious.        Past Medical History:   Diagnosis Date    Arthritis     Atelectasis of left lung 07/2015    Bradycardia 07/17/2015    Calcium blood increased 08/09/2016    Depression     Diastolic dysfunction without heart failure 07/2015    Elevated BUN 08/09/2016    Fatigue 04/29/2015    Hyperglycemia 08/09/2016    Hyperlipidemia     Migraine without aura 10/2015    Prehypertension 11/2016    Testosterone deficiency 04/2015    Thyroid disease        Past Surgical History:   Procedure Laterality Date    ACHILLES TENDON SURGERY      APPENDECTOMY      DUPUYTREN CONTRACTURE RELEASE  2014    LUMBAR EPIDURAL INJECTION  2013    PROSTATE SURGERY      SACROILIAC JOINT INJECTION      TONSILLECTOMY      TRANSURETHRAL RESECTION OF PROSTATE         Family History   Problem Relation Age of Onset    COPD Father        Social History     Social History    Marital status:      Spouse name: N/A    Number of children: N/A    Years of education: N/A     Social History Main Topics    Smoking status: Former Smoker      Quit date: 7/23/1972    Smokeless tobacco: Never Used    Alcohol use Yes      Comment: occassional    Drug use: No    Sexual activity: Not Asked     Other Topics Concern    None     Social History Narrative    None       Current Outpatient Prescriptions   Medication Sig Dispense Refill    buPROPion (WELLBUTRIN) 75 MG tablet Take 150 mg by mouth 3 (three) times daily. 2 Tablet TID      CALCIUM CITRATE/VITAMIN D3 (CITRACAL + D ORAL) Take by mouth. Taking 2 tablets by mouth daily      glucosamine-chondroit-vit C-Mn (GLUCOSAMINE CHONDROITIN MAXSTR) 500-400 mg Cap Take 1 tablet by mouth 3 (three) times daily. 3 Capsule Oral Every day      loratadine (CLARITIN) 10 mg tablet Take 10 mg by mouth once daily.      naproxen sodium (ANAPROX) 220 MG tablet Take 220 mg by mouth 2 (two) times daily with meals. 1 Tablet Oral Twice a day       omega-3 fatty acids 1,000 mg Cap Take by mouth once daily. 1 Capsule Oral Every day      simvastatin (ZOCOR) 20 MG tablet TAKE 1 TABLET EVERY DAY 90 tablet 1    sumatriptan (IMITREX) 100 MG tablet TAKE 1 TABLET  EVERY  2  HOURS AS NEEDED 27 tablet 1     No current facility-administered medications for this visit.        Review of patient's allergies indicates:   Allergen Reactions    Demerol  [meperidine]      Agitation  Other reaction(s): Unknown       Objective:      Physical Exam   Constitutional: He is oriented to person, place, and time. He appears well-developed and well-nourished. No distress.   HENT:   Head: Normocephalic and atraumatic.   Right Ear: External ear normal.   Left Ear: External ear normal.   Nose: Nose normal.   Mouth/Throat: Oropharynx is clear and moist.   Eyes: Conjunctivae and EOM are normal. Right eye exhibits no discharge. Left eye exhibits no discharge. No scleral icterus.   Neck: Normal range of motion. Neck supple. No JVD present. No tracheal deviation present. No thyromegaly present.   Cardiovascular: Normal rate, regular rhythm, normal heart  sounds and intact distal pulses.  Exam reveals no gallop.    No murmur heard.  Pulmonary/Chest: Effort normal and breath sounds normal. No respiratory distress. He has no wheezes. He has no rales.   Abdominal: Soft. Bowel sounds are normal. He exhibits no distension and no mass. There is no tenderness.   Musculoskeletal: Normal range of motion. He exhibits no edema or tenderness.   Lymphadenopathy:     He has no cervical adenopathy.   Neurological: He is alert and oriented to person, place, and time.   Skin: Skin is warm and dry. No rash noted. He is not diaphoretic. No erythema.   Psychiatric: He has a normal mood and affect. His behavior is normal. Judgment and thought content normal.       Lab Results   Component Value Date    WBC 6.0 05/26/2017    HGB 14.8 05/26/2017    HCT 43.4 05/26/2017     05/26/2017    CHOL 166 08/12/2013    TRIG 68 08/12/2013    HDL 59 08/12/2013    ALT 23 08/12/2013    AST 37 05/26/2017     05/26/2017    K 4.4 05/26/2017     05/26/2017    CREATININE 1.09 05/26/2017    BUN 19 05/26/2017    CO2 29.4 05/26/2017    TSH 2.64 05/26/2017    PSA 1.75 08/12/2013    HGBA1C 5.6 05/26/2017     Assessment:       1. Essential hypertension    2. Migraine without aura and responsive to treatment    3. Increased PTH level    4. Dyslipidemia    5. Screening for malignant neoplasm of prostate    6. Vitamin D deficiency    7. Fatigue, unspecified type        Plan:       Borderline hypertension  -     Cancel: TSH; Future; Expected date: 11/21/2017  -     Cancel: T4, free; Future; Expected date: 11/21/2017  -     Urinalysis Microscopic; Future; Expected date: 12/05/2017    Migraine without aura and responsive to treatment    Increased PTH level  -     PTH, intact; Future; Expected date: 11/21/2017  -     Calcium, ionized; Future; Expected date: 11/21/2017    Dyslipidemia  -     Comprehensive metabolic panel; Future; Expected date: 11/21/2017  -     Cancel: Lipid panel; Future; Expected date:  11/21/2017    Vitamin D deficiency    Fatigue, unspecified type  -     Cancel: CBC auto differential; Future; Expected date: 11/21/2017    Screening for malignant neoplasm of prostate  -     PSA, Screening; Future; Expected date: 11/21/2017    Time spent with the patient was 40 min and 50 percent of that was in face to face contact

## 2024-11-01 ENCOUNTER — TELEPHONE (OUTPATIENT)
Dept: NEUROSURGERY | Facility: CLINIC | Age: 64
End: 2024-11-01

## 2024-11-01 ENCOUNTER — TELEPHONE (OUTPATIENT)
Age: 64
End: 2024-11-01

## 2024-11-01 DIAGNOSIS — Z01.818 PRE-PROCEDURAL EXAMINATION: Primary | ICD-10-CM

## 2024-11-01 DIAGNOSIS — D33.3 ACOUSTIC NEUROMA (HCC): ICD-10-CM

## 2024-11-01 NOTE — TELEPHONE ENCOUNTER
Previous orders . Called patient to make her aware of new orders, she expressed understanding and was appreciative of the assistance.

## 2024-11-01 NOTE — TELEPHONE ENCOUNTER
Patient called in regards to scheduling an appt for imaging in of the brain. Patient thought it was the actual office and explained to her that this line refills medications but did ask if she would like us to send a message to the doctor's office to call her for an appt. Patient really wanted to talk to someone from the office so gave her the telephone number before warm transferring her to the office and proceeded to transfer after.

## 2024-11-01 NOTE — TELEPHONE ENCOUNTER
PT CALLED STATING SHE IS READY TO SCHEDULE IMAGING FOR 2YR F/U HDM.    PT STATES SHE HAS HER MRI BRAIN ORDER PRINTED OUT AND WILL BE CALLING CENTRAL SCHEDULING SOON.     PLEASE VERIFY THAT MRI ORDER IS UP TO DATE SO THAT PT CAN SCHEDULE HER MRI.    PT WILL CB TO SCHED SNPX HDM ONCE A DATE IS SET FOR MRI BRAIN.      THANK YOU

## 2024-11-21 ENCOUNTER — HOSPITAL ENCOUNTER (OUTPATIENT)
Facility: MEDICAL CENTER | Age: 64
Discharge: HOME | End: 2024-11-21
Payer: COMMERCIAL

## 2024-11-21 DIAGNOSIS — D33.3 ACOUSTIC NEUROMA (HCC): ICD-10-CM

## 2024-11-21 PROCEDURE — 70553 MRI BRAIN STEM W/O & W/DYE: CPT

## 2024-11-21 PROCEDURE — A9585 GADOBUTROL INJECTION: HCPCS | Performed by: PHYSICIAN ASSISTANT

## 2024-11-21 RX ORDER — GADOBUTROL 604.72 MG/ML
4 INJECTION INTRAVENOUS
Status: COMPLETED | OUTPATIENT
Start: 2024-11-21 | End: 2024-11-21

## 2024-11-21 RX ADMIN — GADOBUTROL 4 ML: 604.72 INJECTION INTRAVENOUS at 17:12

## 2024-11-22 NOTE — ASSESSMENT & PLAN NOTE
2 year follow up of a known right acoustic neuroma as well as previously noted abnormality in the pituitary gland.   Noted on imaging as far back as 2008   Also with concern for pituitary lesion as noted from previous MRI imaging - not seen on recent MRIs.  Evaluated by audiologist Dr. Gamble 11/8/21 with normal hearing bilaterally.     Imaging:  MRI IAC 11/21/24: No interval change evident in a nonspecific approximately 2 mm area of gadolinium enhancement right internal auditory canal; most consistent with intracanalicular vestibular schwannoma. Interval development of a nonspecific lesion of the scalp and subcutaneous fat left paramedian posterior frontal convexity for which clinical correlation/direct visualization assessment is advised. Mild  altered signal involving white matter discussed above is a nonspecific finding most often relating to chronic small vessel white matter ischemic disease; similar to the prior and the degree of which less than typical for age    Plan:  Imaging reviewed in detail with patient as well as Dr. Sinha.  Discussed that schwannoma remains stable in size.  Given stability and small size recommend continued surveillance.  Previously described pituitary lesion not apparent on recent MRIs. Pituitary gland note to be stable on recent MRIs. Will continue surveillance with MRIs.  Incidentally there was a nonspecific scalp/subcutaneous fat lesion noted, new from 1 year ago on CT completed at that time.  While there is no obvious large palpable mass, recommend further evaluation with dermatology.  Discussed that since it is not intracranial, this is outside the neurosurgery scope.  Will order CT head for further evaluation and referral placed to dermatology for management.  Given stability in the acoustic neuroma, ok to continue follow up in 2 years.   Follow-up in 2 years with MRI brain/IAC w/wo to CAP.  Call sooner with any questions or concerns.

## 2024-11-26 ENCOUNTER — TELEPHONE (OUTPATIENT)
Age: 64
End: 2024-11-26

## 2024-11-26 ENCOUNTER — OFFICE VISIT (OUTPATIENT)
Dept: NEUROSURGERY | Facility: CLINIC | Age: 64
End: 2024-11-26
Payer: COMMERCIAL

## 2024-11-26 VITALS
WEIGHT: 102 LBS | HEIGHT: 65 IN | DIASTOLIC BLOOD PRESSURE: 72 MMHG | OXYGEN SATURATION: 97 % | HEART RATE: 88 BPM | BODY MASS INDEX: 17 KG/M2 | SYSTOLIC BLOOD PRESSURE: 112 MMHG

## 2024-11-26 DIAGNOSIS — L98.9 SCALP LESION: Primary | ICD-10-CM

## 2024-11-26 DIAGNOSIS — D33.3 ACOUSTIC NEUROMA (HCC): ICD-10-CM

## 2024-11-26 PROCEDURE — 99215 OFFICE O/P EST HI 40 MIN: CPT | Performed by: PHYSICIAN ASSISTANT

## 2024-11-26 NOTE — PROGRESS NOTES
Name: Judy Rosen      : 1960      MRN: [id number]  Encounter Provider: Tirso Sinha MD  Encounter Date: 2024   Encounter department: [facility] [place]  :  Assessment & Plan  Acoustic neuroma (HCC)  2 year follow up of a known right acoustic neuroma as well as previously noted abnormality in the pituitary gland.   Noted on imaging as far back as    Also with concern for pituitary lesion as noted from previous MRI imaging - not seen on recent MRIs.  Evaluated by audiologist Dr. Gamble 21 with normal hearing bilaterally.     Imaging:  MRI IAC 24: No interval change evident in a nonspecific approximately 2 mm area of gadolinium enhancement right internal auditory canal; most consistent with intracanalicular vestibular schwannoma. Interval development of a nonspecific lesion of the scalp and subcutaneous fat left paramedian posterior frontal convexity for which clinical correlation/direct visualization assessment is advised. Mild  altered signal involving white matter discussed above is a nonspecific finding most often relating to chronic small vessel white matter ischemic disease; similar to the prior and the degree of which less than typical for age    Plan:  Imaging reviewed in detail with patient as well as Dr. Sinha.  Discussed that schwannoma remains stable in size.  Given stability and small size recommend continued surveillance.  Previously described pituitary lesion not apparent on recent MRIs. Pituitary gland note to be stable on recent MRIs. Will continue surveillance with MRIs.  Incidentally there was a nonspecific scalp/subcutaneous fat lesion noted, new from 1 year ago on CT completed at that time.  While there is no obvious large palpable mass, recommend further evaluation with dermatology.  Discussed that since it is not intracranial, this is outside the neurosurgery scope.  Will order CT head for further evaluation and referral  placed to dermatology for management.  Given stability in the acoustic neuroma, ok to continue follow up in 2 years.   Follow-up in 2 years with MRI brain/IAC w/wo to CAP.  Call sooner with any questions or concerns.         Scalp lesion    Orders:    CT head wo contrast; Future    Ambulatory Referral to Dermatology; Future        History of Present Illness     64-year-old female seen for follow-up of a right acoustic neuroma. She was initially referred to our practice in 2019 and was noted with abnormal pituitary on her MRI as well as the acoustic schwannoma.  The acoustic neuroma can be seen as far back as .  The pituitary abnormality has not been clearly seen on subsequent MRIs, though patient states that she has had her imaging reviewed at [facility] and they feel there is a tiny abnormality.  Since last seen, she has no new neurologic complaints.  States that there was some ridging on the top of her scalp but this has since gone away.  In the past, she was evaluated by an audiologist, nothing recent.  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        Review of Systems   All other systems reviewed and are negative.   I have personally reviewed the MA's review of systems and made changes as necessary.    Past Medical History   Past Medical History:   Diagnosis Date    Acoustic neuroma (HCC)     Anxiety     Chronic pain disorder     neck and back pain    Headache(784.0)     Pituitary adenoma (HCC)     monitored    Tension headache     vs migraine     Past Surgical History:   Procedure Laterality Date    CHEILECTOMY Right 2019    Procedure: CHEILECTOMY;  Surgeon: Cristina You DPM;  Location: [facility] OR;  Service: Podiatry     Family History   Problem Relation Age of Onset    Dementia Father           2018    Diabetes Family     Leukemia Family       reports that she quit smoking about 10 years ago. Her smoking use included cigarettes. She has never used smokeless tobacco. She reports  "that she does not drink alcohol and does not use drugs.  Current Outpatient Medications on File Prior to Visit   Medication Sig Dispense Refill    Multiple Vitamin (MULTIVITAMIN) tablet Take 1 tablet by mouth daily       No current facility-administered medications on file prior to visit.     Allergies   Allergen Reactions    Other Anaphylaxis     Hazelnuts    Psyllium Itching    Latex      Added based on information entered during case entry, please review and add reactions, type, and severity as needed    Sulfa Antibiotics       Social History     Tobacco Use    Smoking status: Former     Current packs/day: 0.00     Types: Cigarettes     Quit date: 1/14/2014     Years since quitting: 10.8    Smokeless tobacco: Never    Tobacco comments:     on and off over 40 years , less than a pck per day   Vaping Use    Vaping status: Never Used   Substance and Sexual Activity    Alcohol use: No    Drug use: No    Sexual activity: Not Currently        Objective   /72 (BP Location: Right arm, Patient Position: Sitting, Cuff Size: Adult)   Pulse 88   Ht 5' 5\" (1.651 m)   Wt 46.3 kg (102 lb)   SpO2 97%   BMI 16.97 kg/m²     Physical Exam  Vitals reviewed.   Constitutional:       General: She is awake.      Appearance: Normal appearance.   HENT:      Head: Normocephalic and atraumatic.      Comments: No obvious palpable lesion, subtle prominence noted on the left scalp  Eyes:      Extraocular Movements: EOM normal.      Conjunctiva/sclera: Conjunctivae normal.      Pupils: Pupils are equal, round, and reactive to light.   Cardiovascular:      Rate and Rhythm: Normal rate.   Pulmonary:      Effort: Pulmonary effort is normal.   Skin:     General: Skin is warm and dry.   Neurological:      Mental Status: She is alert and oriented to person, place, and time.      Motor: Motor strength is normal.     Gait: Gait is intact.      Deep Tendon Reflexes:      Reflex Scores:       Bicep reflexes are 2+ on the right side and 2+ on " the left side.       Brachioradialis reflexes are 2+ on the right side and 2+ on the left side.  Psychiatric:         Attention and Perception: Attention and perception normal.         Mood and Affect: Mood and affect normal.         Speech: Speech normal.         Behavior: Behavior normal. Behavior is cooperative.         Thought Content: Thought content normal.         Cognition and Memory: Cognition and memory normal.         Judgment: Judgment normal.       Neurologic Exam     Mental Status   Oriented to person, place, and time.   Follows 2 step commands.   Attention: normal. Concentration: normal.   Speech: speech is normal   Level of consciousness: alert  Knowledge: good.     Cranial Nerves     CN III, IV, VI   Pupils are equal, round, and reactive to light.  Extraocular motions are normal.   Right pupil: Shape: regular. Reactivity: brisk. Consensual response: intact.   Left pupil: Shape: regular. Reactivity: brisk. Consensual response: intact.   CN III: no CN III palsy  CN VI: no CN VI palsy  Nystagmus: none   Ophthalmoparesis: none  Upgaze: normal  Downgaze: normal  Conjugate gaze: present    CN V   Facial sensation intact.     CN VII   Facial expression full, symmetric.     CN VIII   CN VIII normal.     CN XI   Right trapezius strength: normal  Left trapezius strength: normal    CN XII   CN XII normal.     Motor Exam   Muscle bulk: normal  Overall muscle tone: normal    Strength   Strength 5/5 throughout.     Sensory Exam   Light touch normal.     Gait, Coordination, and Reflexes     Gait  Gait: normal    Tremor   Resting tremor: absent  Intention tremor: absent  Action tremor: absent    Reflexes   Right brachioradialis: 2+  Left brachioradialis: 2+  Right biceps: 2+  Left biceps: 2+  Right Ridley: absent  Left Ridley: absent      SL AMB Radiology Results Review: I personally reviewed the following image studies in PACS and associated radiology reports: MRI brain. My interpretation of the radiology  images/reports is: as above.    Administrative Statements   I have spent a total time of 40 minutes in caring for this patient on the day of the visit/encounter including Diagnostic results, Risks and benefits of tx options, Instructions for management, Patient and family education, Impressions, Counseling / Coordination of care, Documenting in the medical record, Reviewing / ordering tests, medicine, procedures  , Obtaining or reviewing history  , and Communicating with other healthcare professionals .

## 2024-11-26 NOTE — TELEPHONE ENCOUNTER
Called and spoke with Judy , stated tumor on front of scalp present just noticed in an MRI , no obvious palpable mass . Admitted headache and history of acoustic neuroma  and schwannoma .   Denies personal or family history of skin cancer .    Patient has an ASAP referral from neurosurgery.

## 2024-11-26 NOTE — TELEPHONE ENCOUNTER
Rec'd call from patient stating she needs an appointment right away due a lesion on scalp on patient's chart there's an ASAP referral from PCP

## 2024-12-03 ENCOUNTER — OFFICE VISIT (OUTPATIENT)
Dept: DERMATOLOGY | Facility: CLINIC | Age: 64
End: 2024-12-03
Payer: COMMERCIAL

## 2024-12-03 VITALS — BODY MASS INDEX: 17 KG/M2 | HEIGHT: 65 IN | TEMPERATURE: 97.9 F | WEIGHT: 102 LBS

## 2024-12-03 DIAGNOSIS — L98.9 SCALP LESION: ICD-10-CM

## 2024-12-03 DIAGNOSIS — D48.5 NEOPLASM OF UNCERTAIN BEHAVIOR OF SKIN: Primary | ICD-10-CM

## 2024-12-03 PROCEDURE — 88313 SPECIAL STAINS GROUP 2: CPT | Performed by: STUDENT IN AN ORGANIZED HEALTH CARE EDUCATION/TRAINING PROGRAM

## 2024-12-03 PROCEDURE — 88312 SPECIAL STAINS GROUP 1: CPT | Performed by: STUDENT IN AN ORGANIZED HEALTH CARE EDUCATION/TRAINING PROGRAM

## 2024-12-03 PROCEDURE — 88342 IMHCHEM/IMCYTCHM 1ST ANTB: CPT | Performed by: STUDENT IN AN ORGANIZED HEALTH CARE EDUCATION/TRAINING PROGRAM

## 2024-12-03 PROCEDURE — 88305 TISSUE EXAM BY PATHOLOGIST: CPT | Performed by: STUDENT IN AN ORGANIZED HEALTH CARE EDUCATION/TRAINING PROGRAM

## 2024-12-03 PROCEDURE — 11104 PUNCH BX SKIN SINGLE LESION: CPT | Performed by: DERMATOLOGY

## 2024-12-03 PROCEDURE — 88341 IMHCHEM/IMCYTCHM EA ADD ANTB: CPT | Performed by: STUDENT IN AN ORGANIZED HEALTH CARE EDUCATION/TRAINING PROGRAM

## 2024-12-03 PROCEDURE — 99204 OFFICE O/P NEW MOD 45 MIN: CPT | Performed by: DERMATOLOGY

## 2024-12-03 NOTE — PROGRESS NOTES
"[facility]at[facility] Clinic Note     Patient Name: Judy Rosen  Encounter Date: 12/03/2024     Have you been cared for by a [facility] Dermatologist in the last 3 years and, if so, which description applies to you?    NO.   I am considered a \"new\" patient and must complete all patient intake questions. I am FEMALE/of child-bearing potential.    REVIEW OF SYSTEMS:  Have you recently had or currently have any of the following? Recent fever or chills? No  Any non-healing wound? No  Are you pregnant or planning to become pregnant? No  Are you currently or planning to be nursing or breast feeding? No   PAST MEDICAL HISTORY:  Have you personally ever had or currently have any of the following?  If \"YES,\" then please provide more detail. Skin cancer (such as Melanoma, Basal Cell Carcinoma, Squamous Cell Carcinoma?  No  Tuberculosis, HIV/AIDS, Hepatitis B or C: YES, Hep C last year  Radiation Treatment No   HISTORY OF IMMUNOSUPPRESSION:   Do you have a history of any of the following:  Systemic Immunosuppression such as Diabetes, Biologic or Immunotherapy, Chemotherapy, Organ Transplantation, Bone Marrow Transplantation or Prednsione?  No    Answering \"YES\" requires the addition of the dotphrase \"IMMUNOSUPPRESSED\" as the first diagnosis of the patient's visit.   FAMILY HISTORY:  Any \"first degree relatives\" (parent, brother, sister, or child) with the following?    Skin Cancer, Pancreatic or Other Cancer? YES, family hx of leukemia    PATIENT EXPERIENCE:    Do you want the Dermatologist to perform a COMPLETE skin exam today including a clinical examination under the \"bra and underwear\" areas?  NO  If necessary, do we have your permission to call and leave a detailed message on your Preferred Phone number that includes your specific medical information?  Yes      Allergies   Allergen Reactions    Other Anaphylaxis     Hazelnuts    Psyllium Itching    Latex      Added based on information entered during case entry, " "please review and add reactions, type, and severity as needed    Sulfa Antibiotics       Current Outpatient Medications:     Multiple Vitamin (MULTIVITAMIN) tablet, Take 1 tablet by mouth daily, Disp: , Rfl:           Whom besides the patient is providing clinical information about today's encounter?   NO ADDITIONAL HISTORIAN (patient alone provided history)    Physical Exam and Assessment/Plan by Diagnosis:    NEOPLASM OF UNCERTAIN BEHAVIOR  Physical Exam:  Anatomic Location Affected:  left frontal scalp   Morphological Description:  Soft, ill-defined 2 cm subcutaneous nodule  Pertinent Positives:  Pertinent Negatives:    Additional History of Present Condition:    Referred from neurology due MRI findings:   \"MRI 11/21/24 - Interval development of a nonspecific lesion of the scalp and subcutaneous fat left paramedian posterior frontal convexity for which clinical correlation/direct visualization assessment is advised.\" Per radiology report, this lesion was not visualized on her CT scan from 8/31/2023. Patient states she has felt something on her scalp in that vicinity for the past few years.     Patient follows with neurology for acoustic neuroma and pituitary adenoma. Patient reports her father also has a hx of acoustic neuroma.     Hx of present illness from [facility] on 11/26/24:  64-year-old female seen for follow-up of a right acoustic neuroma. She was initially referred to our practice in 2019 and was noted with abnormal pituitary on her MRI as well as the acoustic schwannoma. The acoustic neuroma can be seen as far back as 2008. The pituitary abnormality has not been clearly seen on subsequent MRIs, though patient states that she has had her imaging reviewed at [facility] and they feel there is a tiny abnormality. Since last seen, she has no new neurologic complaints. States that there was some ridging on the top of her scalp but this has since gone away. In the past, she was " evaluated by an audiologist, nothing recent. 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     Assessment and Plan:  Based on a thorough discussion of this condition and the management approach to it (including a comprehensive discussion of the known risks, side effects and potential benefits of treatment), the patient (family) agrees to implement the following specific plan:  Will perform punch biopsy today of lesion to help determine with diagnosis   Consent obtained  PROCEDURE NOTE:  PUNCH BIOPSY      Performing Physician:     Anatomic Location; Clinical Description with size (cm); Pre-Op Diagnosis:    A: Left frontal scalp. 4 mm punch. Soft, ill-defined 2 cm subcutaneous nodule. Patient with evidence of subcutaneous lesion on recent MRI for monitoring of acoustic neuroma. Ddx: lipoma vs schwannoma vs other       Anesthesia: 1% xylocaine with epi       Topical anesthesia: None       Indications: To indicate diagnosis and management plan.    Procedure Details     Patient informed of the risks (including bleeding,scaring and infection) and benefits of the procedure explained. Verbal and written informed consent obtained. The area was prepped and draped in the usual fashion. Anesthesia was obtained with 1% lidocaine with epinephrine. The skin was then stretched perpendicular to the skin tension lines and a punch biopsy to an appropriate sampling depth was obtained with a 4 mm punch with a forceps and iris scissors.     Hemostasis was obtained with 4-0 Prolene x 3 sutures.     Complications:  None      Specimen has been sent for review by Dermatopathology.      Plan:  1. Instructed to keep the wound dry and covered for 24-48h and clean thereafter.  2. Warning signs of infection were reviewed.    3. Recommended that the patient use acetaminophen as needed for pain  4. Sutures if any should be removed in 10-14 days      Standard post-procedure care has been explained and has been included in  written form within the patient's copy of Informed Consent.        Scribe Attestation      I,:  Winifred Graham MA am acting as a scribe while in the presence of the attending physician.:       I,:  Joanna Aaron MD personally performed the services described in this documentation    as scribed in my presence.:            Simi Rojas MD  Dermatology, PGY-4

## 2024-12-03 NOTE — PATIENT INSTRUCTIONS
NEOPLASM OF UNCERTAIN BEHAVIOR        Assessment and Plan:  Based on a thorough discussion of this condition and the management approach to it (including a comprehensive discussion of the known risks, side effects and potential benefits of treatment), the patient (family) agrees to implement the following specific plan:  Will perform punch biopsy today of lesion   Consent obtained  PROCEDURE NOTE:  PUNCH BIOPSY        Plan:  1. Instructed to keep the wound dry and covered for 24-48h and clean thereafter.  2. Warning signs of infection were reviewed.    3. Recommended that the patient use acetaminophen as needed for pain  4. Sutures if any should be removed in 10-14 days

## 2024-12-10 ENCOUNTER — HOSPITAL ENCOUNTER (OUTPATIENT)
Dept: CT IMAGING | Facility: HOSPITAL | Age: 64
Discharge: HOME | End: 2024-12-10
Payer: COMMERCIAL

## 2024-12-10 DIAGNOSIS — L98.9 SCALP LESION: ICD-10-CM

## 2024-12-10 PROCEDURE — 70450 CT HEAD/BRAIN W/O DYE: CPT

## 2024-12-11 ENCOUNTER — RESULTS FOLLOW-UP (OUTPATIENT)
Dept: DERMATOLOGY | Facility: CLINIC | Age: 64
End: 2024-12-11

## 2024-12-11 NOTE — RESULT ENCOUNTER NOTE
DERMATOPATHOLOGY RESULT NOTE    Results reviewed by ordering physician.  Called patient to personally discuss results. Discussed results with patient. Discussed biopsy was nonspecific and just showed some increased mucin. Given benign appearance of lesion on MRI and CT, reassuring biopsy results, and ill-defined margins, would not recommend attempting excision. Patient would like to follow up in 6 months, will add on to senior resident schedule for recheck. Discussed monitoring area at home for increase in size or development of symptoms such as pain. Patient already receives MRI brain every two years for other intracranial neoplasms so the lesion of the scalp can be trended with such as well.       Instructions for Clinical Derm Team:   (remember to route Result Note to appropriate staff):    None    Result & Plan by Specimen:    Specimen A: benign  Plan: reassured, benign      Tissue Exam: [id number]  Order: [id number]   Status: Final result      Dx: Neoplasm of uncertain behavior of ski...    Test Result Released: Yes (seen)    View Coordination for this Result      View Follow-Up Encounter     Component  Ref Range & Units (hover)   Case Report  Surgical Pathology Report                         Case: [id number]                                  Authorizing Provider:  Simi Rojas MD           Collected:           12/03/2024 1110              Ordering Location:     [facility]      Received:            12/03/2024 [address] [place] [address]                                                                      Pathologist:           Dylon Haddad MD                                                          Specimen:    Skin, Other, A: left frontal scalp                                                      Final Diagnosis  A. Skin, left frontal scalp, punch biopsy:    Increased dermal mucin and scattered dermal melanophages (see note).    Note: The histopathologic findings are  "non-specific; the findings could be compatible with a mucinosis, though it is not clear whether these findings are consistent with the clinical impression. Clinical pathologic correlation is advised. Pathogenic microorganisms are not seen with PAS stain. Colloidal iron stain was reviewed. SOX10 and p40 immunostains were reviewed; malignancy is not seen. If the lesion were to progress or persist, additional sampling should be sought.    Electronically signed by Dylon Haddad MD on 12/6/2024 at 1104 EST  Additional Information   All reported additional testing was performed with appropriately reactive controls.  These tests were developed and their performance characteristics determined by [facility] or appropriate performing facility, though some tests may be performed on tissues which have not been validated for performance characteristics (such as staining performed on alcohol exposed cell blocks and decalcified tissues).  Results should be interpreted with caution and in the context of the patients' clinical condition. These tests may not be cleared or approved by the U.S. Food and Drug Administration, though the FDA has determined that such clearance or approval is not necessary. These tests are used for clinical purposes and they should not be regarded as investigational or for research. This laboratory has been approved by CLIA 88, designated as a high-complexity laboratory and is qualified to perform these tests.  .  Gross Description   A. The specimen is received in formalin, labeled with the patient's name and hospital number, and is designated \" left frontal scalp\".  The specimen consists of a 0.4 x 0.4 cm punch biopsy of tan skin within underlying soft tissue to a depth of 0.5 cm.  The epithelial surface is hairbearing and focally keratotic.  The skin surface is inked red and the margin of resection is inked green.  The specimen is bisected.  Entirely submitted. One cassette.  " Between sponges.    Note: The estimated total formalin fixation time based upon information provided by the submitting clinician and the standard processing schedule is under 72 hours.      [place]  Clinical Information   ATTN: DERM PATH  A: Left frontal scalp. 4 mm punch. Soft, ill-defined 2 cm subcutaneous nodule. Patient with evidence of subcutaneous lesion on recent MRI for monitoring of acoustic neuroma. Ddx: lipoma vs schwannoma vs other  Resulting Agency [facility]          Specimen Collected: 12/03/24 11:10 AM Last Resulted: 12/06/24 11:04 AM

## 2024-12-17 ENCOUNTER — CLINICAL SUPPORT (OUTPATIENT)
Dept: DERMATOLOGY | Facility: CLINIC | Age: 64
End: 2024-12-17

## 2024-12-17 DIAGNOSIS — Z48.02 ENCOUNTER FOR REMOVAL OF SUTURES: Primary | ICD-10-CM

## 2024-12-17 PROCEDURE — RECHECK: Performed by: REGISTERED NURSE

## 2024-12-17 NOTE — PROGRESS NOTES
"Suture removal    Date/Time: 12/17/2024 12:00 PM    Performed by: Sabra Diamond RN  Authorized by: Viet Lloyd MD  [place] Protocol:  procedure performed by consultantConsent: Verbal consent obtained. Written consent not obtained.  Risks and benefits: risks, benefits and alternatives were discussed  Consent given by: patient  Time out: Immediately prior to procedure a \"time out\" was called to verify the correct patient, procedure, equipment, support staff and site/side marked as required.  Timeout called at: 12/17/2024 11:59 AM.  Patient understanding: patient states understanding of the procedure being performed  Patient consent: the patient's understanding of the procedure matches consent given  Procedure consent: procedure consent matches procedure scheduled  Relevant documents: relevant documents not present or verified  Test results: test results not available  Site marked: the operative site was not marked  Radiology Images displayed and confirmed. If images not available, report reviewed: imaging studies not available  Patient identity confirmed: verbally with patient      Patient location:  Clinic  Location:     Laterality:  Left    Location:  Head/neck    Head/neck location:  Scalp (frontal)  Procedure details:     Tools used:  Suture removal kit    Wound appearance:  No sign(s) of infection, good wound healing and clean  Post-procedure details:     Post-procedure assessment: Vaseline applied.    Patient tolerance of procedure:  Tolerated well, no immediate complications    Before suture removal     After suture removal     "

## 2025-03-03 ENCOUNTER — OFFICE VISIT (OUTPATIENT)
Age: 65
End: 2025-03-03
Payer: COMMERCIAL

## 2025-03-03 VITALS
DIASTOLIC BLOOD PRESSURE: 78 MMHG | SYSTOLIC BLOOD PRESSURE: 122 MMHG | HEIGHT: 65 IN | WEIGHT: 105.8 LBS | OXYGEN SATURATION: 99 % | HEART RATE: 109 BPM | BODY MASS INDEX: 17.63 KG/M2 | TEMPERATURE: 98.1 F

## 2025-03-03 DIAGNOSIS — Z12.31 ENCOUNTER FOR SCREENING MAMMOGRAM FOR BREAST CANCER: ICD-10-CM

## 2025-03-03 DIAGNOSIS — M54.2 CERVICAL PAIN: ICD-10-CM

## 2025-03-03 DIAGNOSIS — Z86.19 HISTORY OF HEPATITIS C: ICD-10-CM

## 2025-03-03 DIAGNOSIS — Z00.00 WELL ADULT EXAM: Primary | ICD-10-CM

## 2025-03-03 DIAGNOSIS — D33.3 ACOUSTIC NEUROMA (HCC): ICD-10-CM

## 2025-03-03 PROBLEM — B18.2 CHRONIC HEPATITIS C WITHOUT HEPATIC COMA (HCC): Status: RESOLVED | Noted: 2024-03-04 | Resolved: 2025-03-03

## 2025-03-03 PROBLEM — H53.9 VISUAL DISTURBANCE: Status: RESOLVED | Noted: 2019-02-28 | Resolved: 2025-03-03

## 2025-03-03 PROCEDURE — 99397 PER PM REEVAL EST PAT 65+ YR: CPT | Performed by: FAMILY MEDICINE

## 2025-03-03 RX ORDER — TRAMADOL HYDROCHLORIDE 50 MG/1
TABLET ORAL
COMMUNITY
Start: 2025-02-13

## 2025-03-03 RX ORDER — OXYCODONE HYDROCHLORIDE 5 MG/1
TABLET ORAL
COMMUNITY
Start: 2025-02-13

## 2025-03-03 NOTE — PROGRESS NOTES
Assessment/Plan:       Diagnoses and all orders for this visit:    Well adult exam  Comments:  Completed.  Patient wishes to hold off with vaccines and mammogram.  Colonoscopy up-to-date.  Check labs  Orders:  -     Comprehensive metabolic panel; Future  -     CBC and differential; Future  -     Lipid panel; Future  -     TSH, 3rd generation with Free T4 reflex; Future    History of hepatitis C  Comments:  Recheck hep C quantitative levels  Orders:  -     Hepatitis C RNA, Quantitative PCR; Future    Acoustic neuroma (HCC)  Comments:  Follow-up with neurology    Encounter for screening mammogram for breast cancer  -     Mammo screening bilateral w 3d and cad; Future    Cervical pain  Comments:  Orthopedics  Orders:  -     Ambulatory Referral to Orthopedic Surgery; Future    Other orders  -     oxyCODONE (ROXICODONE) 5 immediate release tablet; TAKE 1 TABLET BY MOUTH UP TO TWICE A DAY AS NEEDED BREAKTHROUGH PAIN. DO NOT COMBINE WITH TRAMADOL.  -     traMADol (ULTRAM) 50 mg tablet; PLEASE SEE ATTACHED FOR DETAILED DIRECTIONS            Subjective:        Patient ID: Judy Rosen is a 64 y.o. female.      Patient is here for wellness exam.  Labs and vaccines reviewed.  Patient treated for hepatitis C last year.  Colonoscopy 2022.  Patient has had mammograms in the past.          The following portions of the patient's history were reviewed and updated as appropriate: allergies, current medications, past family history, past medical history, past social history, past surgical history and problem list.      Review of Systems   Constitutional: Negative.    HENT: Negative.     Eyes: Negative.    Respiratory: Negative.     Cardiovascular: Negative.    Gastrointestinal: Negative.    Endocrine: Negative.    Genitourinary: Negative.    Musculoskeletal: Negative.    Skin: Negative.    Allergic/Immunologic: Negative.    Neurological:  Positive for headaches.   Hematological: Negative.    Psychiatric/Behavioral: Negative.       "       Objective:        Depression Screening and Follow-up Plan: Patient was screened for depression during today's encounter. They screened negative with a PHQ-2 score of 0.              /78 (BP Location: Left arm, Patient Position: Sitting, Cuff Size: Standard)   Pulse (!) 109   Temp 98.1 °F (36.7 °C) (Temporal)   Ht 5' 5\" (1.651 m)   Wt 48 kg (105 lb 12.8 oz)   SpO2 99%   BMI 17.61 kg/m²          Physical Exam  Vitals and nursing note reviewed.   Constitutional:       General: She is not in acute distress.     Appearance: Normal appearance. She is not ill-appearing, toxic-appearing or diaphoretic.   HENT:      Head: Normocephalic and atraumatic.      Right Ear: Tympanic membrane, ear canal and external ear normal. There is no impacted cerumen.      Left Ear: Tympanic membrane, ear canal and external ear normal. There is no impacted cerumen.      Nose: Nose normal. No congestion or rhinorrhea.      Mouth/Throat:      Mouth: Mucous membranes are moist.      Pharynx: No oropharyngeal exudate or posterior oropharyngeal erythema.   Eyes:      General: No scleral icterus.        Right eye: No discharge.         Left eye: No discharge.   Neck:      Vascular: No carotid bruit.   Cardiovascular:      Rate and Rhythm: Normal rate and regular rhythm.      Pulses: Normal pulses.      Heart sounds: Normal heart sounds. No murmur heard.     No friction rub. No gallop.   Pulmonary:      Effort: Pulmonary effort is normal. No respiratory distress.      Breath sounds: Normal breath sounds. No stridor. No wheezing, rhonchi or rales.   Chest:      Chest wall: No tenderness.   Musculoskeletal:         General: No swelling, tenderness, deformity or signs of injury. Normal range of motion.      Cervical back: Normal range of motion and neck supple. No rigidity. No muscular tenderness.      Right lower leg: No edema.      Left lower leg: No edema.   Lymphadenopathy:      Cervical: No cervical adenopathy.   Skin:     " General: Skin is warm and dry.      Capillary Refill: Capillary refill takes less than 2 seconds.      Coloration: Skin is not jaundiced.      Findings: No bruising, erythema, lesion or rash.   Neurological:      Mental Status: She is alert and oriented to person, place, and time. Mental status is at baseline.      Cranial Nerves: No cranial nerve deficit.      Sensory: No sensory deficit.      Motor: No weakness.      Coordination: Coordination normal.      Gait: Gait normal.   Psychiatric:         Mood and Affect: Mood normal.         Behavior: Behavior normal.         Thought Content: Thought content normal.         Judgment: Judgment normal.

## 2025-04-24 ENCOUNTER — APPOINTMENT (OUTPATIENT)
Age: 65
End: 2025-04-24
Payer: COMMERCIAL

## 2025-04-24 DIAGNOSIS — Z86.19 HISTORY OF HEPATITIS C: ICD-10-CM

## 2025-04-24 DIAGNOSIS — Z00.00 WELL ADULT EXAM: ICD-10-CM

## 2025-04-24 LAB
ALBUMIN SERPL BCG-MCNC: 4.6 G/DL (ref 3.5–5)
ALP SERPL-CCNC: 82 U/L (ref 34–104)
ALT SERPL W P-5'-P-CCNC: 22 U/L (ref 7–52)
ANION GAP SERPL CALCULATED.3IONS-SCNC: 9 MMOL/L (ref 4–13)
AST SERPL W P-5'-P-CCNC: 25 U/L (ref 13–39)
BASOPHILS # BLD AUTO: 0.04 THOUSANDS/ÂΜL (ref 0–0.1)
BASOPHILS NFR BLD AUTO: 1 % (ref 0–1)
BILIRUB SERPL-MCNC: 0.54 MG/DL (ref 0.2–1)
BUN SERPL-MCNC: 16 MG/DL (ref 5–25)
CALCIUM SERPL-MCNC: 9.4 MG/DL (ref 8.4–10.2)
CHLORIDE SERPL-SCNC: 103 MMOL/L (ref 96–108)
CHOLEST SERPL-MCNC: 193 MG/DL (ref ?–200)
CO2 SERPL-SCNC: 30 MMOL/L (ref 21–32)
CREAT SERPL-MCNC: 0.74 MG/DL (ref 0.6–1.3)
EOSINOPHIL # BLD AUTO: 0.1 THOUSAND/ÂΜL (ref 0–0.61)
EOSINOPHIL NFR BLD AUTO: 2 % (ref 0–6)
ERYTHROCYTE [DISTWIDTH] IN BLOOD BY AUTOMATED COUNT: 11.9 % (ref 11.6–15.1)
GFR SERPL CREATININE-BSD FRML MDRD: 85 ML/MIN/1.73SQ M
GLUCOSE P FAST SERPL-MCNC: 92 MG/DL (ref 65–99)
HCT VFR BLD AUTO: 46.4 % (ref 34.8–46.1)
HDLC SERPL-MCNC: 71 MG/DL
HGB BLD-MCNC: 14.9 G/DL (ref 11.5–15.4)
IMM GRANULOCYTES # BLD AUTO: 0.01 THOUSAND/UL (ref 0–0.2)
IMM GRANULOCYTES NFR BLD AUTO: 0 % (ref 0–2)
LDLC SERPL CALC-MCNC: 107 MG/DL (ref 0–100)
LYMPHOCYTES # BLD AUTO: 2.68 THOUSANDS/ÂΜL (ref 0.6–4.47)
LYMPHOCYTES NFR BLD AUTO: 43 % (ref 14–44)
MCH RBC QN AUTO: 31.2 PG (ref 26.8–34.3)
MCHC RBC AUTO-ENTMCNC: 32.1 G/DL (ref 31.4–37.4)
MCV RBC AUTO: 97 FL (ref 82–98)
MONOCYTES # BLD AUTO: 0.45 THOUSAND/ÂΜL (ref 0.17–1.22)
MONOCYTES NFR BLD AUTO: 7 % (ref 4–12)
NEUTROPHILS # BLD AUTO: 3.02 THOUSANDS/ÂΜL (ref 1.85–7.62)
NEUTS SEG NFR BLD AUTO: 47 % (ref 43–75)
NONHDLC SERPL-MCNC: 122 MG/DL
NRBC BLD AUTO-RTO: 0 /100 WBCS
PLATELET # BLD AUTO: 307 THOUSANDS/UL (ref 149–390)
PMV BLD AUTO: 11.6 FL (ref 8.9–12.7)
POTASSIUM SERPL-SCNC: 3.9 MMOL/L (ref 3.5–5.3)
PROT SERPL-MCNC: 7.9 G/DL (ref 6.4–8.4)
RBC # BLD AUTO: 4.78 MILLION/UL (ref 3.81–5.12)
SODIUM SERPL-SCNC: 142 MMOL/L (ref 135–147)
TRIGL SERPL-MCNC: 73 MG/DL (ref ?–150)
TSH SERPL DL<=0.05 MIU/L-ACNC: 1.35 UIU/ML (ref 0.45–4.5)
WBC # BLD AUTO: 6.3 THOUSAND/UL (ref 4.31–10.16)

## 2025-04-24 PROCEDURE — 80061 LIPID PANEL: CPT

## 2025-04-24 PROCEDURE — 84443 ASSAY THYROID STIM HORMONE: CPT

## 2025-04-24 PROCEDURE — 85025 COMPLETE CBC W/AUTO DIFF WBC: CPT

## 2025-04-24 PROCEDURE — 87522 HEPATITIS C REVRS TRNSCRPJ: CPT

## 2025-04-24 PROCEDURE — 36415 COLL VENOUS BLD VENIPUNCTURE: CPT

## 2025-04-24 PROCEDURE — 80053 COMPREHEN METABOLIC PANEL: CPT

## 2025-04-25 ENCOUNTER — RESULTS FOLLOW-UP (OUTPATIENT)
Age: 65
End: 2025-04-25

## 2025-04-25 LAB — HCV RNA SERPL NAA+PROBE-ACNC: NOT DETECTED K[IU]/ML

## 2025-06-30 ENCOUNTER — OFFICE VISIT (OUTPATIENT)
Age: 65
End: 2025-06-30
Payer: COMMERCIAL

## 2025-06-30 VITALS
OXYGEN SATURATION: 97 % | BODY MASS INDEX: 16.89 KG/M2 | HEART RATE: 84 BPM | HEIGHT: 65 IN | WEIGHT: 101.4 LBS | TEMPERATURE: 98.6 F | SYSTOLIC BLOOD PRESSURE: 118 MMHG | DIASTOLIC BLOOD PRESSURE: 76 MMHG

## 2025-06-30 DIAGNOSIS — M25.571 ACUTE RIGHT ANKLE PAIN: Primary | ICD-10-CM

## 2025-06-30 PROCEDURE — 99213 OFFICE O/P EST LOW 20 MIN: CPT | Performed by: FAMILY MEDICINE

## 2025-06-30 RX ORDER — MELOXICAM 15 MG/1
15 TABLET ORAL DAILY PRN
Qty: 30 TABLET | Refills: 0 | Status: SHIPPED | OUTPATIENT
Start: 2025-06-30

## 2025-06-30 NOTE — ASSESSMENT & PLAN NOTE
Patient use cool compresses as well as meloxicam and go for x-ray of the right ankle.  Check laboratory studies.  Orders:    meloxicam (MOBIC) 15 mg tablet; Take 1 tablet (15 mg total) by mouth daily as needed for moderate pain    XR ankle 3+ vw right; Future    CBC and differential; Future    Comprehensive metabolic panel; Future    Uric acid; Future    C-reactive protein; Future

## 2025-06-30 NOTE — PROGRESS NOTES
"Name: Judy Rosen      : 1960      MRN: [id number]  Encounter Provider: Jun Dupree DO  Encounter Date: 2025   Encounter department: [facility]  :  Assessment & Plan  Acute right ankle pain  Patient use cool compresses as well as meloxicam and go for x-ray of the right ankle.  Check laboratory studies.  Orders:    meloxicam (MOBIC) 15 mg tablet; Take 1 tablet (15 mg total) by mouth daily as needed for moderate pain    XR ankle 3+ vw right; Future    CBC and differential; Future    Comprehensive metabolic panel; Future    Uric acid; Future    C-reactive protein; Future           History of Present Illness   Patient is here with some right ankle pain and some redness and swelling medially over the medial malleolus status post skating.  No treatment used.  No falls noted.     Ankle Pain       Review of Systems   Constitutional: Negative.    HENT: Negative.     Eyes: Negative.    Respiratory: Negative.     Cardiovascular: Negative.    Gastrointestinal: Negative.    Endocrine: Negative.    Genitourinary: Negative.    Musculoskeletal:  Positive for arthralgias.   Skin:  Positive for color change.   Allergic/Immunologic: Negative.    Neurological: Negative.    Hematological: Negative.    Psychiatric/Behavioral: Negative.         Objective   /76 (BP Location: Left arm, Patient Position: Sitting, Cuff Size: Standard)   Pulse 84   Temp 98.6 °F (37 °C) (Temporal)   Ht 5' 5\" (1.651 m)   Wt 46 kg (101 lb 6.4 oz)   SpO2 97%   BMI 16.87 kg/m²      Physical Exam  Vitals and nursing note reviewed.   Constitutional:       General: She is not in acute distress.     Appearance: She is not ill-appearing, toxic-appearing or diaphoretic.     Musculoskeletal:         General: Tenderness present.      Comments: Some tenderness and swelling over the medial malleolus on the right.         "

## 2025-07-09 ENCOUNTER — APPOINTMENT (OUTPATIENT)
Age: 65
End: 2025-07-09
Payer: COMMERCIAL

## 2025-07-09 DIAGNOSIS — M25.571 ACUTE RIGHT ANKLE PAIN: ICD-10-CM

## 2025-07-09 LAB
ALBUMIN SERPL BCG-MCNC: 4.4 G/DL (ref 3.5–5)
ALP SERPL-CCNC: 75 U/L (ref 34–104)
ALT SERPL W P-5'-P-CCNC: 22 U/L (ref 7–52)
ANION GAP SERPL CALCULATED.3IONS-SCNC: 10 MMOL/L (ref 4–13)
AST SERPL W P-5'-P-CCNC: 28 U/L (ref 13–39)
BASOPHILS # BLD AUTO: 0.03 THOUSANDS/ÂΜL (ref 0–0.1)
BASOPHILS NFR BLD AUTO: 0 % (ref 0–1)
BILIRUB SERPL-MCNC: 0.69 MG/DL (ref 0.2–1)
BUN SERPL-MCNC: 16 MG/DL (ref 5–25)
CALCIUM SERPL-MCNC: 8.7 MG/DL (ref 8.4–10.2)
CHLORIDE SERPL-SCNC: 103 MMOL/L (ref 96–108)
CO2 SERPL-SCNC: 27 MMOL/L (ref 21–32)
CREAT SERPL-MCNC: 0.76 MG/DL (ref 0.6–1.3)
CRP SERPL QL: <1 MG/L
EOSINOPHIL # BLD AUTO: 0.09 THOUSAND/ÂΜL (ref 0–0.61)
EOSINOPHIL NFR BLD AUTO: 1 % (ref 0–6)
ERYTHROCYTE [DISTWIDTH] IN BLOOD BY AUTOMATED COUNT: 11.9 % (ref 11.6–15.1)
GFR SERPL CREATININE-BSD FRML MDRD: 82 ML/MIN/1.73SQ M
GLUCOSE SERPL-MCNC: 73 MG/DL (ref 65–140)
HCT VFR BLD AUTO: 41.5 % (ref 34.8–46.1)
HGB BLD-MCNC: 13.7 G/DL (ref 11.5–15.4)
IMM GRANULOCYTES # BLD AUTO: 0.02 THOUSAND/UL (ref 0–0.2)
IMM GRANULOCYTES NFR BLD AUTO: 0 % (ref 0–2)
LYMPHOCYTES # BLD AUTO: 2.5 THOUSANDS/ÂΜL (ref 0.6–4.47)
LYMPHOCYTES NFR BLD AUTO: 30 % (ref 14–44)
MCH RBC QN AUTO: 31.1 PG (ref 26.8–34.3)
MCHC RBC AUTO-ENTMCNC: 33 G/DL (ref 31.4–37.4)
MCV RBC AUTO: 94 FL (ref 82–98)
MONOCYTES # BLD AUTO: 0.54 THOUSAND/ÂΜL (ref 0.17–1.22)
MONOCYTES NFR BLD AUTO: 7 % (ref 4–12)
NEUTROPHILS # BLD AUTO: 5.16 THOUSANDS/ÂΜL (ref 1.85–7.62)
NEUTS SEG NFR BLD AUTO: 62 % (ref 43–75)
NRBC BLD AUTO-RTO: 0 /100 WBCS
PLATELET # BLD AUTO: 253 THOUSANDS/UL (ref 149–390)
PMV BLD AUTO: 12.2 FL (ref 8.9–12.7)
POTASSIUM SERPL-SCNC: 3.6 MMOL/L (ref 3.5–5.3)
PROT SERPL-MCNC: 7 G/DL (ref 6.4–8.4)
RBC # BLD AUTO: 4.4 MILLION/UL (ref 3.81–5.12)
SODIUM SERPL-SCNC: 140 MMOL/L (ref 135–147)
URATE SERPL-MCNC: 3.5 MG/DL (ref 2–7.5)
WBC # BLD AUTO: 8.34 THOUSAND/UL (ref 4.31–10.16)

## 2025-07-09 PROCEDURE — 80053 COMPREHEN METABOLIC PANEL: CPT

## 2025-07-09 PROCEDURE — 85025 COMPLETE CBC W/AUTO DIFF WBC: CPT

## 2025-07-09 PROCEDURE — 36415 COLL VENOUS BLD VENIPUNCTURE: CPT

## 2025-07-09 PROCEDURE — 73610 X-RAY EXAM OF ANKLE: CPT

## 2025-07-09 PROCEDURE — 86140 C-REACTIVE PROTEIN: CPT

## 2025-07-09 PROCEDURE — 84550 ASSAY OF BLOOD/URIC ACID: CPT

## (undated) DEVICE — GLOVE INDICATOR PI UNDERGLOVE SZ 7.5 BLUE

## (undated) DEVICE — STOCKINETTE REGULAR

## (undated) DEVICE — ACE WRAP 4 IN UNSTERILE

## (undated) DEVICE — CHLORAPREP HI-LITE 26ML ORANGE

## (undated) DEVICE — BLADE SAGITTAL 25.6 X 9.5MM

## (undated) DEVICE — 3M™ STERI-STRIP™ REINFORCED ADHESIVE SKIN CLOSURES, R1542, 1/4 IN X 1-1/2 IN (6 MM X 38 MM), 6 STRIPS/ENVELOPE: Brand: 3M™ STERI-STRIP™

## (undated) DEVICE — CURITY NON-ADHERENT STRIPS: Brand: CURITY

## (undated) DEVICE — SYRINGE 3ML LL

## (undated) DEVICE — SCD SEQUENTIAL COMPRESSION COMFORT SLEEVE MEDIUM KNEE LENGTH: Brand: KENDALL SCD

## (undated) DEVICE — SUT MONOCRYL 5-0 PC-2 18 IN Y495G

## (undated) DEVICE — GLOVE INDICATOR PI UNDERGLOVE SZ 7 BLUE

## (undated) DEVICE — NEEDLE 25G X 1 1/2

## (undated) DEVICE — GLOVE SRG BIOGEL 6.5

## (undated) DEVICE — SUT VICRYL 4-0 PS-2 27 IN J426H

## (undated) DEVICE — SUT MONOCRYL 5-0 P-3 18 IN Y493G

## (undated) DEVICE — ABDOMINAL PAD: Brand: DERMACEA

## (undated) DEVICE — BETHLEHEM UNIVERSAL  MIONR EXT: Brand: CARDINAL HEALTH

## (undated) DEVICE — 2000CC GUARDIAN II: Brand: GUARDIAN

## (undated) DEVICE — CAST PADDING 4 IN SYNTHETIC NON-STRL

## (undated) DEVICE — PENCIL ELECTROSURG E-Z CLEAN -0035H

## (undated) DEVICE — GLOVE PI ULTRA TOUCH SZ.6.5

## (undated) DEVICE — INTENDED FOR TISSUE SEPARATION, AND OTHER PROCEDURES THAT REQUIRE A SHARP SURGICAL BLADE TO PUNCTURE OR CUT.: Brand: BARD-PARKER SAFETY BLADES SIZE 15, STERILE

## (undated) DEVICE — NEEDLE 18 G X 1 1/2

## (undated) DEVICE — 10FR FRAZIER SUCTION HANDLE: Brand: CARDINAL HEALTH

## (undated) DEVICE — MASTISOL LIQ ADHESIVE 2/3ML

## (undated) DEVICE — SUT VICRYL 3-0 PS-2 27 IN J427H

## (undated) DEVICE — 3M™ STERI-STRIP™ REINFORCED ADHESIVE SKIN CLOSURES, R1546, 1/4 IN X 4 IN (6 MM X 100 MM), 10 STRIPS/ENVELOPE: Brand: 3M™ STERI-STRIP™

## (undated) DEVICE — SYRINGE 10ML LL

## (undated) DEVICE — INTENDED FOR TISSUE SEPARATION, AND OTHER PROCEDURES THAT REQUIRE A SHARP SURGICAL BLADE TO PUNCTURE OR CUT.: Brand: BARD-PARKER ® CARBON RIB-BACK BLADES

## (undated) DEVICE — POV-IOD SOLUTION 4OZ BT

## (undated) DEVICE — TUBING SUCTION 5MM X 12 FT

## (undated) DEVICE — STRETCH BANDAGE: Brand: CURITY